# Patient Record
Sex: FEMALE | Race: WHITE | NOT HISPANIC OR LATINO | Employment: FULL TIME | URBAN - METROPOLITAN AREA
[De-identification: names, ages, dates, MRNs, and addresses within clinical notes are randomized per-mention and may not be internally consistent; named-entity substitution may affect disease eponyms.]

---

## 2020-06-18 ENCOUNTER — LAB VISIT (OUTPATIENT)
Dept: PRIMARY CARE CLINIC | Facility: OTHER | Age: 65
End: 2020-06-18

## 2020-06-18 DIAGNOSIS — J02.9 SORE THROAT: ICD-10-CM

## 2020-06-18 DIAGNOSIS — Z03.818 ENCOUNTER FOR OBSERVATION FOR SUSPECTED EXPOSURE TO OTHER BIOLOGICAL AGENTS RULED OUT: ICD-10-CM

## 2020-06-18 PROCEDURE — U0003 INFECTIOUS AGENT DETECTION BY NUCLEIC ACID (DNA OR RNA); SEVERE ACUTE RESPIRATORY SYNDROME CORONAVIRUS 2 (SARS-COV-2) (CORONAVIRUS DISEASE [COVID-19]), AMPLIFIED PROBE TECHNIQUE, MAKING USE OF HIGH THROUGHPUT TECHNOLOGIES AS DESCRIBED BY CMS-2020-01-R: HCPCS

## 2020-06-21 LAB — SARS-COV-2 RNA RESP QL NAA+PROBE: NOT DETECTED

## 2021-04-26 ENCOUNTER — PATIENT MESSAGE (OUTPATIENT)
Dept: RESEARCH | Facility: HOSPITAL | Age: 66
End: 2021-04-26

## 2023-06-26 ENCOUNTER — RESEARCH ENCOUNTER (OUTPATIENT)
Dept: RESEARCH | Facility: OTHER | Age: 68
End: 2023-06-26

## 2023-06-26 NOTE — RESEARCH
Sponsor: [employer]     Study Title/IRB Number: [id number]    Principle Investigator: Dr. Álvaro Pruitt    Patient eligibility was checked prior to enrollment in the study. Patient met the following inclusion and exclusion criteria:     INCLUSION CRITERIA  Participant age is 50 years or older at the time of signing the Informed Consent form  Participant is capable of giving signed Informed Consent, which includes compliance with the requirements and restrictions in the informed consent form and the protocol    EXCLUSION CRITERIA  Participant is undergoing or referred for diagnostic evaluation due to clinical suspicion for cancer  Participant has a personal history of invasive or hematologic malignancy, diagnosed within the last 3 years prior to expected enrollment date or diagnosed greater than 3 years prior to expected enrollment date and never treated  Participant has had definitive treatment for invasive or hematologic malignancy within the 3 years prior to expected enrollment date  Participant is not able to comply with protocol procedures  Participant is not a current patient at a participating center  Participant is currently enrolled or was previously enrolled in another [employer]-sponsored study  Participant is current or previous employee/contractor of [employer]  Participant is currently pregnant (by participant's self-report of pregnancy status)    Prior to the Informed Consent (IC) being signed, or any study protocol required data collection, testing, procedure, or intervention being performed, the following was done and/or discussed:  Patient was given a copy of the IC for review   Purpose of the study and qualifications to participate   Study design, Follow up schedule, and tests or procedures done at each visit  Confidentiality and HIPAA Authorization for Release of Medical Records for the research trial/ subject's rights/research related injury  Risk, Benefits, Alternative Treatments, Compensation and  Costs  Participation in the research trial is voluntary and patient may withdraw at anytime  Contact information for study related questions    Patient verbalizes understanding of the above: Yes  Contact information for CRC and PI given to patient: Yes  Patient able to adequately summarize: the purpose of the study, the risks associated with the study, and all procedures, testing, and follow-ups associated with the study: Yes    Patient signed the informed consent form for the research study with an IRB approval date of 4/25/2022. Each page of the consent form was reviewed with patient and all questions answered satisfactorily.   Patient received a copy of the consent form. The original consent was scanned into electronic medical records.    Anthropometric Data    Participant is a 67 y.o., White, Not  or /a, female  Participant height: 5'4  Participant weight: 117 lbs       Smoking History and Alcohol use     Please indicate whether the participant smoked at least 100 cigarettes in their lifetime:   Yes  Please indicate whether the participant is a current smoker:  No  Age participant started smoking cigarettes:  26 years old  Age participant stopped smoking cigarettes:  29 years old  During their time as a smoker, please indicate if the participant stopped smoking for a month or more: Not Applicable  Please indicate how many cigarettes per day did the participant smoke on average for the majority of their time as a smoker: 20 cigarettes per day    During the last 12 months, how often did the participant have any kind of drink containing alcohol? Count as one drink a 12 ounce can or glass of beer, a 5 ounce glass of wine, or a drink containing 1 shot of liquor. Participant has consumed alcohol previously, but not during the past 12 months}  During the last 12 months, how many drinks did the participant have on days when they drank alcohol?Not Applicable    Blood Draw    Following IC being signed and  prior to blood draw, patient completed all baseline/pretest questionnaires. The following specimens were collected from the pt at the time of this encounter via peripheral blood draw.    Blood draw location: Right Arm  Needle used: 21 gauge butterfly needle  Blood draw amount: 40ml  Blood draw time: 11:25AM 6/26/2023

## 2023-07-07 ENCOUNTER — RESEARCH ENCOUNTER (OUTPATIENT)
Dept: RESEARCH | Facility: OTHER | Age: 68
End: 2023-07-07

## 2023-07-07 NOTE — PROGRESS NOTES
Kendrick Pathfinder 2022.003    Kendrick ID: [id number]     Results the Kendrick Alejandre Multi Cancer Early Detection test and were reviewed by PI Dr Pruitt. Patient was called and notified of test results, there was no signal detected.    Patient reminded, this was a screening test, so we still highly encourage them to continue other normal health screenings  and to continue to adhere to guideline-recommended cancer screening.    Patient was asked about completing 30 day questionnaire online and was agreeable.

## 2024-04-10 ENCOUNTER — TELEPHONE (OUTPATIENT)
Dept: PRIMARY CARE CLINIC | Facility: CLINIC | Age: 69
End: 2024-04-10
Payer: MEDICARE

## 2024-04-10 NOTE — TELEPHONE ENCOUNTER
----- Message from Lala Shah sent at 4/10/2024  4:45 PM CDT -----  Contact: [phone]  Pt states Dr Naylor came highly recommended by a friend of hers (Kashmir Blanca). She lives out of town but would like to est care with her and no appt are showing up available. She is requesting First choice is week of 5/20/2024 and 2nd choice is week of 5/13/2024. Can you call her to discuss if this would be possible? Thanks

## 2024-04-11 ENCOUNTER — TELEPHONE (OUTPATIENT)
Dept: PRIMARY CARE CLINIC | Facility: CLINIC | Age: 69
End: 2024-04-11
Payer: MEDICARE

## 2024-04-11 NOTE — TELEPHONE ENCOUNTER
----- Message from Rachelle Silveira sent at 4/11/2024  1:31 PM CDT -----  Contact: [phone]  Patient is returning a phone call.  Who left a message for the patient: Dr Naylor's office  Does patient know what this is regarding:  yes  Would you like a call back, or a response through your [facility] portal?:   phone  Comments:

## 2024-04-11 NOTE — TELEPHONE ENCOUNTER
Here with mom Tasha.    Mom states that patient has been having clear to green yellow nasal discharge since last week Wednesday.     Denies fevers.    Decreased appetite.     Mom states that patient is not sleeping well.     Using nose katie and saline will work good for a few moments then back to square one. Also tried to use a vicks rub and it has not helped either.      I sw pt and scheduled an appt to est care

## 2024-05-05 ENCOUNTER — RX ONLY (RX ONLY)
Age: 69
End: 2024-05-05

## 2024-05-08 ENCOUNTER — TELEPHONE (OUTPATIENT)
Dept: PRIMARY CARE CLINIC | Facility: CLINIC | Age: 69
End: 2024-05-08
Payer: MEDICARE

## 2024-05-08 NOTE — TELEPHONE ENCOUNTER
----- Message from Chris Aguiar sent at 5/8/2024  2:16 PM CDT -----  Contact: [phone]  Patient states she would like to have medical records sent to office please provide an email.  Patient states she can not fax information. Please call and advise.    Thank you and have a great day.

## 2024-05-20 ENCOUNTER — OFFICE VISIT (OUTPATIENT)
Dept: PRIMARY CARE CLINIC | Facility: CLINIC | Age: 69
End: 2024-05-20
Payer: MEDICARE

## 2024-05-20 ENCOUNTER — LAB VISIT (OUTPATIENT)
Dept: LAB | Facility: HOSPITAL | Age: 69
End: 2024-05-20
Payer: MEDICARE

## 2024-05-20 VITALS
DIASTOLIC BLOOD PRESSURE: 78 MMHG | OXYGEN SATURATION: 98 % | BODY MASS INDEX: 22.39 KG/M2 | WEIGHT: 121.69 LBS | HEART RATE: 66 BPM | HEIGHT: 62 IN | SYSTOLIC BLOOD PRESSURE: 110 MMHG

## 2024-05-20 DIAGNOSIS — M40.209 KYPHOSIS, UNSPECIFIED KYPHOSIS TYPE, UNSPECIFIED SPINAL REGION: ICD-10-CM

## 2024-05-20 DIAGNOSIS — R15.9 INCONTINENCE OF FECES, UNSPECIFIED FECAL INCONTINENCE TYPE: ICD-10-CM

## 2024-05-20 DIAGNOSIS — Z00.00 ANNUAL PHYSICAL EXAM: Primary | ICD-10-CM

## 2024-05-20 DIAGNOSIS — Z00.00 ANNUAL PHYSICAL EXAM: ICD-10-CM

## 2024-05-20 DIAGNOSIS — F98.8 ATTENTION DEFICIT DISORDER, UNSPECIFIED HYPERACTIVITY PRESENCE: ICD-10-CM

## 2024-05-20 DIAGNOSIS — Z12.11 ENCOUNTER FOR COLORECTAL CANCER SCREENING: ICD-10-CM

## 2024-05-20 DIAGNOSIS — Z78.0 MENOPAUSE: ICD-10-CM

## 2024-05-20 DIAGNOSIS — Z12.31 ENCOUNTER FOR SCREENING MAMMOGRAM FOR MALIGNANT NEOPLASM OF BREAST: ICD-10-CM

## 2024-05-20 DIAGNOSIS — Z76.89 ESTABLISHING CARE WITH NEW DOCTOR, ENCOUNTER FOR: ICD-10-CM

## 2024-05-20 DIAGNOSIS — N81.89 PELVIC FLOOR WEAKNESS IN FEMALE: ICD-10-CM

## 2024-05-20 DIAGNOSIS — R87.619 ABNORMAL CERVICAL PAPANICOLAOU SMEAR, UNSPECIFIED ABNORMAL PAP FINDING: ICD-10-CM

## 2024-05-20 DIAGNOSIS — L98.8 WRINKLES: ICD-10-CM

## 2024-05-20 DIAGNOSIS — E04.1 THYROID NODULE: ICD-10-CM

## 2024-05-20 DIAGNOSIS — Z12.12 ENCOUNTER FOR COLORECTAL CANCER SCREENING: ICD-10-CM

## 2024-05-20 DIAGNOSIS — Z79.899 OTHER LONG TERM (CURRENT) DRUG THERAPY: ICD-10-CM

## 2024-05-20 DIAGNOSIS — D22.9 ATYPICAL MOLE: ICD-10-CM

## 2024-05-20 DIAGNOSIS — Z78.9 VEGETARIAN DIET: ICD-10-CM

## 2024-05-20 DIAGNOSIS — Z91.89 DES EXPOSURE IN UTERO: ICD-10-CM

## 2024-05-20 LAB
25(OH)D3+25(OH)D2 SERPL-MCNC: 62 NG/ML (ref 30–96)
ALBUMIN SERPL BCP-MCNC: 4.2 G/DL (ref 3.5–5.2)
ALP SERPL-CCNC: 44 U/L (ref 55–135)
ALT SERPL W/O P-5'-P-CCNC: 27 U/L (ref 10–44)
ANION GAP SERPL CALC-SCNC: 9 MMOL/L (ref 8–16)
AST SERPL-CCNC: 31 U/L (ref 10–40)
BASOPHILS # BLD AUTO: 0.05 K/UL (ref 0–0.2)
BASOPHILS NFR BLD: 1 % (ref 0–1.9)
BILIRUB SERPL-MCNC: 0.6 MG/DL (ref 0.1–1)
BILIRUB UR QL STRIP: NEGATIVE
BUN SERPL-MCNC: 20 MG/DL (ref 8–23)
CALCIUM SERPL-MCNC: 9.9 MG/DL (ref 8.7–10.5)
CHLORIDE SERPL-SCNC: 100 MMOL/L (ref 95–110)
CHOLEST SERPL-MCNC: 268 MG/DL (ref 120–199)
CHOLEST/HDLC SERPL: 2.6 {RATIO} (ref 2–5)
CLARITY UR REFRACT.AUTO: CLEAR
CO2 SERPL-SCNC: 28 MMOL/L (ref 23–29)
COLOR UR AUTO: YELLOW
CREAT SERPL-MCNC: 1 MG/DL (ref 0.5–1.4)
DIFFERENTIAL METHOD BLD: ABNORMAL
EOSINOPHIL # BLD AUTO: 0.4 K/UL (ref 0–0.5)
EOSINOPHIL NFR BLD: 6.8 % (ref 0–8)
ERYTHROCYTE [DISTWIDTH] IN BLOOD BY AUTOMATED COUNT: 13.3 % (ref 11.5–14.5)
EST. GFR  (NO RACE VARIABLE): >60 ML/MIN/1.73 M^2
ESTIMATED AVG GLUCOSE: 111 MG/DL (ref 68–131)
GLUCOSE SERPL-MCNC: 88 MG/DL (ref 70–110)
GLUCOSE UR QL STRIP: NEGATIVE
HBA1C MFR BLD: 5.5 % (ref 4–5.6)
HCT VFR BLD AUTO: 44.5 % (ref 37–48.5)
HCV AB SERPL QL IA: NORMAL
HDLC SERPL-MCNC: 102 MG/DL (ref 40–75)
HDLC SERPL: 38.1 % (ref 20–50)
HGB BLD-MCNC: 14.8 G/DL (ref 12–16)
HGB UR QL STRIP: NEGATIVE
IMM GRANULOCYTES # BLD AUTO: 0.01 K/UL (ref 0–0.04)
IMM GRANULOCYTES NFR BLD AUTO: 0.2 % (ref 0–0.5)
KETONES UR QL STRIP: NEGATIVE
LDLC SERPL CALC-MCNC: 153.6 MG/DL (ref 63–159)
LEUKOCYTE ESTERASE UR QL STRIP: NEGATIVE
LYMPHOCYTES # BLD AUTO: 2.5 K/UL (ref 1–4.8)
LYMPHOCYTES NFR BLD: 48.8 % (ref 18–48)
MCH RBC QN AUTO: 30.7 PG (ref 27–31)
MCHC RBC AUTO-ENTMCNC: 33.3 G/DL (ref 32–36)
MCV RBC AUTO: 92 FL (ref 82–98)
MONOCYTES # BLD AUTO: 0.5 K/UL (ref 0.3–1)
MONOCYTES NFR BLD: 8.9 % (ref 4–15)
NEUTROPHILS # BLD AUTO: 1.8 K/UL (ref 1.8–7.7)
NEUTROPHILS NFR BLD: 34.3 % (ref 38–73)
NITRITE UR QL STRIP: NEGATIVE
NONHDLC SERPL-MCNC: 166 MG/DL
NRBC BLD-RTO: 0 /100 WBC
PH UR STRIP: 5 [PH] (ref 5–8)
PLATELET # BLD AUTO: 302 K/UL (ref 150–450)
PMV BLD AUTO: 11.3 FL (ref 9.2–12.9)
POTASSIUM SERPL-SCNC: 4.5 MMOL/L (ref 3.5–5.1)
PROT SERPL-MCNC: 7.5 G/DL (ref 6–8.4)
PROT UR QL STRIP: NEGATIVE
RBC # BLD AUTO: 4.82 M/UL (ref 4–5.4)
SODIUM SERPL-SCNC: 137 MMOL/L (ref 136–145)
SP GR UR STRIP: 1.01 (ref 1–1.03)
TRIGL SERPL-MCNC: 62 MG/DL (ref 30–150)
TSH SERPL DL<=0.005 MIU/L-ACNC: 1.57 UIU/ML (ref 0.4–4)
URN SPEC COLLECT METH UR: NORMAL
VIT B12 SERPL-MCNC: 1248 PG/ML (ref 210–950)
WBC # BLD AUTO: 5.14 K/UL (ref 3.9–12.7)

## 2024-05-20 PROCEDURE — 80061 LIPID PANEL: CPT | Performed by: STUDENT IN AN ORGANIZED HEALTH CARE EDUCATION/TRAINING PROGRAM

## 2024-05-20 PROCEDURE — 99204 OFFICE O/P NEW MOD 45 MIN: CPT | Mod: S$GLB,,, | Performed by: STUDENT IN AN ORGANIZED HEALTH CARE EDUCATION/TRAINING PROGRAM

## 2024-05-20 PROCEDURE — 85025 COMPLETE CBC W/AUTO DIFF WBC: CPT | Performed by: STUDENT IN AN ORGANIZED HEALTH CARE EDUCATION/TRAINING PROGRAM

## 2024-05-20 PROCEDURE — 80053 COMPREHEN METABOLIC PANEL: CPT | Performed by: STUDENT IN AN ORGANIZED HEALTH CARE EDUCATION/TRAINING PROGRAM

## 2024-05-20 PROCEDURE — 3078F DIAST BP <80 MM HG: CPT | Mod: CPTII,S$GLB,, | Performed by: STUDENT IN AN ORGANIZED HEALTH CARE EDUCATION/TRAINING PROGRAM

## 2024-05-20 PROCEDURE — 82306 VITAMIN D 25 HYDROXY: CPT | Performed by: STUDENT IN AN ORGANIZED HEALTH CARE EDUCATION/TRAINING PROGRAM

## 2024-05-20 PROCEDURE — 3288F FALL RISK ASSESSMENT DOCD: CPT | Mod: CPTII,S$GLB,, | Performed by: STUDENT IN AN ORGANIZED HEALTH CARE EDUCATION/TRAINING PROGRAM

## 2024-05-20 PROCEDURE — 3008F BODY MASS INDEX DOCD: CPT | Mod: CPTII,S$GLB,, | Performed by: STUDENT IN AN ORGANIZED HEALTH CARE EDUCATION/TRAINING PROGRAM

## 2024-05-20 PROCEDURE — 81003 URINALYSIS AUTO W/O SCOPE: CPT | Performed by: STUDENT IN AN ORGANIZED HEALTH CARE EDUCATION/TRAINING PROGRAM

## 2024-05-20 PROCEDURE — 1160F RVW MEDS BY RX/DR IN RCRD: CPT | Mod: CPTII,S$GLB,, | Performed by: STUDENT IN AN ORGANIZED HEALTH CARE EDUCATION/TRAINING PROGRAM

## 2024-05-20 PROCEDURE — 1126F AMNT PAIN NOTED NONE PRSNT: CPT | Mod: CPTII,S$GLB,, | Performed by: STUDENT IN AN ORGANIZED HEALTH CARE EDUCATION/TRAINING PROGRAM

## 2024-05-20 PROCEDURE — 99999 PR PBB SHADOW E&M-EST. PATIENT-LVL V: CPT | Mod: PBBFAC,,, | Performed by: STUDENT IN AN ORGANIZED HEALTH CARE EDUCATION/TRAINING PROGRAM

## 2024-05-20 PROCEDURE — 82607 VITAMIN B-12: CPT | Performed by: STUDENT IN AN ORGANIZED HEALTH CARE EDUCATION/TRAINING PROGRAM

## 2024-05-20 PROCEDURE — 86803 HEPATITIS C AB TEST: CPT | Performed by: STUDENT IN AN ORGANIZED HEALTH CARE EDUCATION/TRAINING PROGRAM

## 2024-05-20 PROCEDURE — 36415 COLL VENOUS BLD VENIPUNCTURE: CPT | Mod: PN | Performed by: STUDENT IN AN ORGANIZED HEALTH CARE EDUCATION/TRAINING PROGRAM

## 2024-05-20 PROCEDURE — 83036 HEMOGLOBIN GLYCOSYLATED A1C: CPT | Performed by: STUDENT IN AN ORGANIZED HEALTH CARE EDUCATION/TRAINING PROGRAM

## 2024-05-20 PROCEDURE — 1101F PT FALLS ASSESS-DOCD LE1/YR: CPT | Mod: CPTII,S$GLB,, | Performed by: STUDENT IN AN ORGANIZED HEALTH CARE EDUCATION/TRAINING PROGRAM

## 2024-05-20 PROCEDURE — 1159F MED LIST DOCD IN RCRD: CPT | Mod: CPTII,S$GLB,, | Performed by: STUDENT IN AN ORGANIZED HEALTH CARE EDUCATION/TRAINING PROGRAM

## 2024-05-20 PROCEDURE — 3074F SYST BP LT 130 MM HG: CPT | Mod: CPTII,S$GLB,, | Performed by: STUDENT IN AN ORGANIZED HEALTH CARE EDUCATION/TRAINING PROGRAM

## 2024-05-20 PROCEDURE — 84443 ASSAY THYROID STIM HORMONE: CPT | Performed by: STUDENT IN AN ORGANIZED HEALTH CARE EDUCATION/TRAINING PROGRAM

## 2024-05-20 RX ORDER — TRETINOIN 1 MG/G
CREAM TOPICAL NIGHTLY
Qty: 20 G | Refills: 0 | Status: SHIPPED | OUTPATIENT
Start: 2024-05-20 | End: 2024-05-22

## 2024-05-20 RX ORDER — DEXTROAMPHETAMINE SACCHARATE, AMPHETAMINE ASPARTATE, DEXTROAMPHETAMINE SULFATE, AND AMPHETAMINE SULFATE 7.5; 7.5; 7.5; 7.5 MG/1; MG/1; MG/1; MG/1
30 TABLET ORAL DAILY
COMMUNITY
Start: 2022-01-01

## 2024-05-20 NOTE — PROGRESS NOTES
Marisa Lin  1955        Subjective     Chief Complaint: HTN follow-up    History of Present Illness:  Ms. Marisa Lin is a 68 y.o. female who presents to clinic for est care. Lives in [place]. Travels back and forth to [place]. Needs new PCP.     Runs marathons. Also pilates. Underwater running.   Noticed fecal and urinary incontinence. Mainly with long-distance running. Sometimes has to take imodium before.   Also some chronic constipation. Not new. Used to be on fiber but not recently.   Has had vaginal deliveries x2.    Abnormal pap smear in 1988. Mom took ETRRA (diethylstilbestrol) while pregnant with her.   HPV+  Reports recently pap smears have been normal per pt. Due now. Needs new OBGYN.    Hx of thyroid nodule- has had bx years ago. No longer on Synthroid.    ADD- on Adderall. Sees Psych.     Was on Retinol 1%, asking for refill.     Due for mammogram, colonoscopy, pap smear/GYN visit, DEXA.  Q 5 yrs colon per pt, polyp.      Review of Systems   Constitutional:  Negative for chills, fever and malaise/fatigue.   HENT:  Negative for congestion and sore throat.    Respiratory:  Negative for cough.    Cardiovascular:  Negative for leg swelling.   Gastrointestinal:  Positive for constipation (Chronic).   Genitourinary:  Positive for frequency.   Musculoskeletal:  Positive for back pain.   Neurological:  Negative for sensory change and speech change.   Psychiatric/Behavioral:  The patient is not nervous/anxious.         PAST HISTORY:     Past Medical History:   Diagnosis Date    Abnormal Pap smear of cervix     ADD (attention deficit disorder)     Nontoxic single thyroid nodule        Past Surgical History:   Procedure Laterality Date    BIOPSY OF THYROID      FACIAL COSMETIC SURGERY      2017       No family history on file.      MEDICATIONS & ALLERGIES:     Current Outpatient Medications on File Prior to Visit   Medication Sig    ADDERALL 30 mg Tab Take 30 mg by mouth once daily. 1/2 in the am, 1/2  "in the pm daily.     No current facility-administered medications on file prior to visit.       Review of patient's allergies indicates:  No Known Allergies    OBJECTIVE:     Vital Signs:  Vitals:    05/20/24 1105   BP: 110/78   BP Location: Right arm   Patient Position: Sitting   BP Method: Medium (Manual)   Pulse: 66   SpO2: 98%   Weight: 55.2 kg (121 lb 11.1 oz)   Height: 5' 2" (1.575 m)       Body mass index is 22.26 kg/m².     Physical Exam:  Physical Exam  Vitals and nursing note reviewed.   Constitutional:       General: She is not in acute distress.     Appearance: Normal appearance. She is not ill-appearing, toxic-appearing or diaphoretic.   HENT:      Head: Normocephalic and atraumatic.        Comments: Brown skin lesion of right upper ear  Eyes:      General: No scleral icterus.        Right eye: No discharge.         Left eye: No discharge.      Conjunctiva/sclera: Conjunctivae normal.   Cardiovascular:      Rate and Rhythm: Normal rate and regular rhythm.      Heart sounds: Normal heart sounds.   Pulmonary:      Effort: Pulmonary effort is normal. No respiratory distress.      Breath sounds: Normal breath sounds. No wheezing or rales.   Musculoskeletal:         General: No swelling. Normal range of motion.      Cervical back: Normal range of motion and neck supple. No rigidity or tenderness.      Right lower leg: No edema.      Left lower leg: No edema.   Lymphadenopathy:      Cervical: No cervical adenopathy.   Skin:     General: Skin is warm and dry.             Comments: Small, brown, flat, stuck-on appearing lesion on right mid back   Neurological:      Mental Status: She is alert and oriented to person, place, and time. Mental status is at baseline.      Gait: Gait normal.   Psychiatric:         Mood and Affect: Mood normal.         Behavior: Behavior normal.            Laboratory  No results found for: "GLU", "NA", "K", "CL", "CO2", "BUN", "CREATININE", "CALCIUM", "MG"  No results found for: " ""HGBA1C"  No results for input(s): "POCTGLUCOSE" in the last 72 hours.        ASSESSMENT & PLAN:   Ms. Marisa Lin is a 68 y.o. female who was seen today in clinic for est care. Increase fiber. Start with pelvic floor PT and Urogyn eval.   Colorectal if needed.   Due for colonoscopy, mammogram, DEXA, Pap smear, thyroid US.    Diagnoses and all orders for this visit:    Annual physical exam  -     CBC Auto Differential; Future  -     Comprehensive Metabolic Panel; Future  -     Hemoglobin A1C; Future  -     Lipid Panel; Future  -     TSH; Future  -     Vitamin D; Future  -     Hepatitis C Antibody; Future    Establishing care with new doctor, encounter for  -     CBC Auto Differential; Future  -     Comprehensive Metabolic Panel; Future  -     Hemoglobin A1C; Future  -     Lipid Panel; Future  -     TSH; Future  -     Vitamin D; Future  -     Hepatitis C Antibody; Future    Pelvic floor weakness in female  -     CBC Auto Differential; Future  -     Comprehensive Metabolic Panel; Future  -     Hemoglobin A1C; Future  -     Lipid Panel; Future  -     TSH; Future  -     Vitamin D; Future  -     Hepatitis C Antibody; Future  -     Ambulatory referral/consult to Physical/Occupational Therapy; Future  -     Cancel: Ambulatory referral/consult to Colorectal Surgery; Future  -     Urinalysis, Reflex to Urine Culture Urine, Clean Catch  -     Ambulatory referral/consult to Urogynecology; Future  -     VITAMIN B12; Future    Incontinence of feces, unspecified fecal incontinence type  -     CBC Auto Differential; Future  -     Comprehensive Metabolic Panel; Future  -     Hemoglobin A1C; Future  -     Lipid Panel; Future  -     TSH; Future  -     Vitamin D; Future  -     Hepatitis C Antibody; Future  -     Ambulatory referral/consult to Physical/Occupational Therapy; Future  -     Cancel: Ambulatory referral/consult to Colorectal Surgery; Future  -     VITAMIN B12; Future    Attention deficit disorder, unspecified " hyperactivity presence  -     CBC Auto Differential; Future  -     Comprehensive Metabolic Panel; Future  -     Hemoglobin A1C; Future  -     Lipid Panel; Future  -     TSH; Future  -     Vitamin D; Future  -     Hepatitis C Antibody; Future  -     VITAMIN B12; Future    Other long term (current) drug therapy  -     CBC Auto Differential; Future  -     Comprehensive Metabolic Panel; Future  -     Hemoglobin A1C; Future  -     Lipid Panel; Future  -     TSH; Future  -     Vitamin D; Future  -     Hepatitis C Antibody; Future  -     VITAMIN B12; Future    Menopause  -     CBC Auto Differential; Future  -     Comprehensive Metabolic Panel; Future  -     Hemoglobin A1C; Future  -     Lipid Panel; Future  -     TSH; Future  -     Vitamin D; Future  -     Hepatitis C Antibody; Future  -     DXA Bone Density Axial Skeleton 1 or more sites; Future  -     Ambulatory referral/consult to Obstetrics / Gynecology  -     VITAMIN B12; Future    Atypical mole  -     Ambulatory referral/consult to Dermatology; Future    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    Vegetarian diet  -     VITAMIN B12; Future    Encounter for colorectal cancer screening  -     Ambulatory referral/consult to Endo Procedure ; Future    Encounter for screening mammogram for malignant neoplasm of breast  -     Mammo Digital Screening Bilat w/ Hasmukh; Future    Kyphosis, unspecified kyphosis type, unspecified spinal region  -     CBC Auto Differential; Future  -     Comprehensive Metabolic Panel; Future  -     Hemoglobin A1C; Future  -     Lipid Panel; Future  -     TSH; Future  -     Vitamin D; Future  -     Hepatitis C Antibody;  Future  -     VITAMIN B12; Future         1. Annual physical exam    2. Establishing care with new doctor, encounter for    3. Pelvic floor weakness in female    4. Incontinence of feces, unspecified fecal incontinence type    5. Attention deficit disorder, unspecified hyperactivity presence    6. Other long term (current) drug therapy    7. Menopause    8. Atypical mole    9. 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 Abnormal cervical Papanicolaou smear, unspecified abnormal pap finding    11. Thyroid nodule    12. Wrinkles    13. Vegetarian diet    14. Encounter for colorectal cancer screening    15. Encounter for screening mammogram for malignant neoplasm of breast    16. Kyphosis, unspecified kyphosis type, unspecified spinal region        RTC in     Alexsandra Naylor MD

## 2024-05-21 ENCOUNTER — TELEPHONE (OUTPATIENT)
Dept: PRIMARY CARE CLINIC | Facility: CLINIC | Age: 69
End: 2024-05-21
Payer: MEDICARE

## 2024-05-21 NOTE — TELEPHONE ENCOUNTER
----- Message from Valentina Ruiz sent at 5/21/2024  9:04 AM CDT -----  Contact: Pt  [phone]  Patient would like the 3 orders from yesterday sent to [facility] so that she can get them completed ASAP    Please call and advise.    Thank You

## 2024-05-22 ENCOUNTER — PATIENT MESSAGE (OUTPATIENT)
Dept: PRIMARY CARE CLINIC | Facility: CLINIC | Age: 69
End: 2024-05-22
Payer: MEDICARE

## 2024-05-22 DIAGNOSIS — M81.0 AGE-RELATED OSTEOPOROSIS WITHOUT CURRENT PATHOLOGICAL FRACTURE: ICD-10-CM

## 2024-05-22 DIAGNOSIS — L98.8 WRINKLES: ICD-10-CM

## 2024-05-22 DIAGNOSIS — E04.1 THYROID NODULE: Primary | ICD-10-CM

## 2024-05-22 DIAGNOSIS — N81.89 PELVIC FLOOR WEAKNESS IN FEMALE: Primary | ICD-10-CM

## 2024-05-22 DIAGNOSIS — R15.9 INCONTINENCE OF FECES, UNSPECIFIED FECAL INCONTINENCE TYPE: ICD-10-CM

## 2024-05-22 LAB — BCS RECOMMENDATION EXT: NORMAL

## 2024-05-22 RX ORDER — TRETINOIN 1 MG/G
CREAM TOPICAL NIGHTLY
Qty: 45 G | Refills: 0 | Status: SHIPPED | OUTPATIENT
Start: 2024-05-22

## 2024-05-22 NOTE — TELEPHONE ENCOUNTER
Called pt, she felt she needed to speak with a provider urgently and is going out of town on Friday, scheduled for virtual at 4:15 w/ NP Rachelle to discuss concern about family Hx of heart issues

## 2024-05-22 NOTE — TELEPHONE ENCOUNTER
----- Message from Betsy Rock sent at 5/22/2024  1:55 PM CDT -----  Contact: Pt [phone]  She said to send another script for the name brand tretinoin (RETIN-A) 0.1 % cream and increase 45g and she will pay out of pocket for it because the  is running a [employer]    [id number] - [facility] [address] [place] [address] AT [facility] Phone: [phone] Fax: [phone]

## 2024-05-22 NOTE — PROGRESS NOTES
US head and neck with thyroid nodule recommending BX. Endo referral placed. Images of reports below.  Can we let her know? She can also do this in [place] if she desires.  Additionally, her DEXA shows osteoporosis. If she would like to discuss treatments please let me know.

## 2024-05-23 ENCOUNTER — OFFICE VISIT (OUTPATIENT)
Dept: PRIMARY CARE CLINIC | Facility: CLINIC | Age: 69
End: 2024-05-23
Payer: MEDICARE

## 2024-05-23 DIAGNOSIS — Z79.899 ENCOUNTER FOR MEDICATION REVIEW: Primary | ICD-10-CM

## 2024-05-23 PROCEDURE — 1159F MED LIST DOCD IN RCRD: CPT | Mod: CPTII,95,, | Performed by: NURSE PRACTITIONER

## 2024-05-23 PROCEDURE — 99213 OFFICE O/P EST LOW 20 MIN: CPT | Mod: 95,,, | Performed by: NURSE PRACTITIONER

## 2024-05-23 PROCEDURE — 3044F HG A1C LEVEL LT 7.0%: CPT | Mod: CPTII,95,, | Performed by: NURSE PRACTITIONER

## 2024-05-23 PROCEDURE — 1160F RVW MEDS BY RX/DR IN RCRD: CPT | Mod: CPTII,95,, | Performed by: NURSE PRACTITIONER

## 2024-05-23 NOTE — PROGRESS NOTES
[facility] Note    Chief Complaint      Chief Complaint   Patient presents with    lab results       History of Present Illness      Marisa Lin is a 68 y.o. female who presents today via virtual visit for   Chief Complaint   Patient presents with    lab results         Patient is new to me.  She presents via virtual visit to discuss lab results from 05/20/2024.  Lab results were reviewed with patient and questions answered.  Patient is also requesting a coronary calcium scan.  I will pass this information on to her primary care physician.  Patient reports eating healthy daily and exercising by running marathons.  There are no other issues to address at this time.         Review of Systems   All 12 systems otherwise negative.       Family History:  family history is not on file.   Family history was reviewed with patient.     Medications:  Outpatient Encounter Medications as of 5/23/2024   Medication Sig Dispense Refill    ADDERALL 30 mg Tab Take 30 mg by mouth once daily. 1/2 in the am, 1/2 in the pm daily.      RETIN-A 0.1 % cream Apply topically every evening. 45 g 0    [DISCONTINUED] tretinoin (RETIN-A) 0.1 % cream Apply topically every evening. 20 g 0     No facility-administered encounter medications on file as of 5/23/2024.       Allergies:  Review of patient's allergies indicates:  No Known Allergies    Health Maintenance:  Health Maintenance   Topic Date Due    TETANUS VACCINE  Never done    Mammogram  Never done    DEXA Scan  Never done    Colorectal Cancer Screening  Never done    Shingles Vaccine (1 of 2) Never done    Lipid Panel  05/20/2029    Hepatitis C Screening  Completed     Health Maintenance Topics with due status: Not Due       Topic Last Completion Date    Lipid Panel 05/20/2024    Influenza Vaccine Not Due       Physical Exam       ]        Assessment/Plan     Marisa Lin is a 68 y.o.female with:    There are no diagnoses linked to this encounter.    As above, continue  current medications and maintain follow up with specialists.  Return to clinic as needed.    Greater than 50% of this time was spent face to face via virtual visit with patient.  All questions were answered to patient's satisfaction.    The patient location is: home  The chief complaint leading to consultation is: questions about labs    Visit type: audiovisual    Face to Face time with patient:   20 minutes of total time spent on the encounter, which includes face to face time and non-face to face time preparing to see the patient (eg, review of tests), Obtaining and/or reviewing separately obtained history, Documenting clinical information in the electronic or other health record, Independently interpreting results (not separately reported) and communicating results to the patient/family/caregiver, or Care coordination (not separately reported).         Each patient to whom he or she provides medical services by telemedicine is:  (1) informed of the relationship between the physician and patient and the respective role of any other health care provider with respect to management of the patient; and (2) notified that he or she may decline to receive medical services by telemedicine and may withdraw from such care at any time.       Karen L Spencer, NP-C [facility]    Answers submitted by the patient for this visit:  Review of Systems Questionnaire (Submitted on 5/23/2024)  activity change: No  unexpected weight change: No  neck pain: No  hearing loss: No  rhinorrhea: No  trouble swallowing: No  eye discharge: No  visual disturbance: No  chest tightness: No  wheezing: No  chest pain: No  palpitations: No  blood in stool: No  constipation: No  vomiting: No  diarrhea: No  polydipsia: No  polyuria: No  difficulty urinating: No  hematuria: No  menstrual problem: No  dysuria: No  joint swelling: No  arthralgias: No  headaches: No  weakness: No  confusion: No  dysphoric mood: No

## 2024-06-20 ENCOUNTER — PATIENT MESSAGE (OUTPATIENT)
Dept: PRIMARY CARE CLINIC | Facility: CLINIC | Age: 69
End: 2024-06-20
Payer: MEDICARE

## 2024-06-20 DIAGNOSIS — E04.1 THYROID NODULE: Primary | ICD-10-CM

## 2024-07-08 ENCOUNTER — APPOINTMENT (RX ONLY)
Dept: URBAN - METROPOLITAN AREA CLINIC 15 | Facility: CLINIC | Age: 69
Setting detail: DERMATOLOGY
End: 2024-07-08

## 2024-07-08 VITALS — WEIGHT: 120 LBS | HEIGHT: 62 IN

## 2024-07-08 DIAGNOSIS — D18.0 HEMANGIOMA: ICD-10-CM

## 2024-07-08 DIAGNOSIS — L82.1 OTHER SEBORRHEIC KERATOSIS: ICD-10-CM

## 2024-07-08 DIAGNOSIS — L81.4 OTHER MELANIN HYPERPIGMENTATION: ICD-10-CM

## 2024-07-08 DIAGNOSIS — L64.8 OTHER ANDROGENIC ALOPECIA: ICD-10-CM | Status: INADEQUATELY CONTROLLED

## 2024-07-08 DIAGNOSIS — D22 MELANOCYTIC NEVI: ICD-10-CM

## 2024-07-08 PROBLEM — D18.01 HEMANGIOMA OF SKIN AND SUBCUTANEOUS TISSUE: Status: ACTIVE | Noted: 2024-07-08

## 2024-07-08 PROBLEM — D22.72 MELANOCYTIC NEVI OF LEFT LOWER LIMB, INCLUDING HIP: Status: ACTIVE | Noted: 2024-07-08

## 2024-07-08 PROBLEM — D22.5 MELANOCYTIC NEVI OF TRUNK: Status: ACTIVE | Noted: 2024-07-08

## 2024-07-08 PROBLEM — D22.61 MELANOCYTIC NEVI OF RIGHT UPPER LIMB, INCLUDING SHOULDER: Status: ACTIVE | Noted: 2024-07-08

## 2024-07-08 PROBLEM — D22.71 MELANOCYTIC NEVI OF RIGHT LOWER LIMB, INCLUDING HIP: Status: ACTIVE | Noted: 2024-07-08

## 2024-07-08 PROBLEM — D22.62 MELANOCYTIC NEVI OF LEFT UPPER LIMB, INCLUDING SHOULDER: Status: ACTIVE | Noted: 2024-07-08

## 2024-07-08 PROCEDURE — ? ADDITIONAL NOTES

## 2024-07-08 PROCEDURE — ? SUNSCREEN RECOMMENDATIONS

## 2024-07-08 PROCEDURE — ? COUNSELING

## 2024-07-08 PROCEDURE — ? PRESCRIPTION MEDICATION MANAGEMENT

## 2024-07-08 PROCEDURE — ? PRESCRIPTION

## 2024-07-08 PROCEDURE — 99204 OFFICE O/P NEW MOD 45 MIN: CPT

## 2024-07-08 RX ORDER — MINOXIDIL 2.5 MG/1
1 TABLET ORAL QD
Qty: 30 | Refills: 2 | Status: ERX | COMMUNITY
Start: 2024-07-08

## 2024-07-08 RX ADMIN — MINOXIDIL 1: 2.5 TABLET ORAL at 00:00

## 2024-07-08 ASSESSMENT — LOCATION SIMPLE DESCRIPTION DERM
LOCATION SIMPLE: LEFT FOREARM
LOCATION SIMPLE: CHEST
LOCATION SIMPLE: LEFT POSTERIOR UPPER ARM
LOCATION SIMPLE: POSTERIOR SCALP
LOCATION SIMPLE: RIGHT FOREARM
LOCATION SIMPLE: TRAPEZIAL NECK
LOCATION SIMPLE: LEFT PRETIBIAL REGION
LOCATION SIMPLE: RIGHT THIGH
LOCATION SIMPLE: UPPER BACK
LOCATION SIMPLE: LEFT UPPER BACK
LOCATION SIMPLE: ABDOMEN
LOCATION SIMPLE: RIGHT PRETIBIAL REGION
LOCATION SIMPLE: LEFT POSTERIOR THIGH
LOCATION SIMPLE: LEFT ANTERIOR NECK
LOCATION SIMPLE: RIGHT POSTERIOR UPPER ARM
LOCATION SIMPLE: LEFT THIGH

## 2024-07-08 ASSESSMENT — LOCATION ZONE DERM
LOCATION ZONE: NECK
LOCATION ZONE: SCALP
LOCATION ZONE: ARM
LOCATION ZONE: TRUNK
LOCATION ZONE: LEG

## 2024-07-08 ASSESSMENT — LOCATION DETAILED DESCRIPTION DERM
LOCATION DETAILED: EPIGASTRIC SKIN
LOCATION DETAILED: RIGHT ANTERIOR DISTAL THIGH
LOCATION DETAILED: LEFT PROXIMAL PRETIBIAL REGION
LOCATION DETAILED: RIGHT PROXIMAL DORSAL FOREARM
LOCATION DETAILED: UPPER STERNUM
LOCATION DETAILED: POSTERIOR MID-PARIETAL SCALP
LOCATION DETAILED: SUPERIOR THORACIC SPINE
LOCATION DETAILED: LEFT ANTERIOR DISTAL THIGH
LOCATION DETAILED: RIGHT DISTAL POSTERIOR UPPER ARM
LOCATION DETAILED: RIGHT PROXIMAL PRETIBIAL REGION
LOCATION DETAILED: LEFT DISTAL POSTERIOR UPPER ARM
LOCATION DETAILED: INFERIOR THORACIC SPINE
LOCATION DETAILED: LEFT PROXIMAL RADIAL DORSAL FOREARM
LOCATION DETAILED: LEFT MEDIAL UPPER BACK
LOCATION DETAILED: LEFT INFERIOR LATERAL NECK
LOCATION DETAILED: PERIUMBILICAL SKIN
LOCATION DETAILED: RIGHT PROXIMAL RADIAL DORSAL FOREARM
LOCATION DETAILED: LEFT DISTAL RADIAL DORSAL FOREARM
LOCATION DETAILED: LEFT DISTAL POSTERIOR THIGH
LOCATION DETAILED: MID TRAPEZIAL NECK

## 2024-07-08 NOTE — HPI: HAIR LOSS
Additional History: Pt uses men’s minoxidil (leaves hair oily) , biotin, “red light hat”, and reports hair coming out from the root for over 5 years. Pt is interested in oral minoxidil and recommendations. Consent for bx signed

## 2024-07-08 NOTE — PROCEDURE: PRESCRIPTION MEDICATION MANAGEMENT
Discontinue Regimen: .\\n\\n-Discontinue topical minoxidil\\n\\n.
Detail Level: Zone
Render In Strict Bullet Format?: No
Initiate Treatment: .\\n\\nORAL\\n-minoxidil 2.5 mg tablet: Take one quarter of a tablet by mouth once daily.\\n\\n.

## 2024-07-08 NOTE — PROCEDURE: COUNSELING
Detail Level: Detailed
Detail Level: Generalized
Minoxidil 5% Topical Foam Recommendations: Can purchase OTC at [employer], [employer], or [employer].

## 2024-07-08 NOTE — HPI: EVALUATION OF SKIN LESION(S)
What Type Of Note Output Would You Prefer (Optional)?: Bullet Format
Hpi Title: Evaluation of Skin Lesions
Additional History: Fbse, f/h of melanoma (niece and first cousin). Pt has never had skin CA. Concerned with a spot on her right ear and another on her back. Pt wants to ensure a thorough examination of the scalp is done due to a friend having melanoma removed from the scalp.
Family Member: Niece, cousin

## 2024-07-08 NOTE — PROCEDURE: ADDITIONAL NOTES
Additional Notes: Pt has been using Rogaine at home and a red-light hat. She would like additional tx options.
Render Risk Assessment In Note?: no
Detail Level: Detailed

## 2024-07-08 NOTE — PROCEDURE: SUNSCREEN RECOMMENDATIONS
General Sunscreen Counseling: I recommended a broad spectrum sunscreen with a SPF of 30 or higher.  I explained that SPF 30 sunscreens block approximately 97 percent of the sun's harmful rays.  Sunscreens should be applied at least 15 minutes prior to expected sun exposure and then every 2 hours after that as long as sun exposure continues. If swimming or exercising sunscreen should be reapplied every 45 minutes to an hour after getting wet or sweating.  One ounce, or the equivalent of a shot glass full of sunscreen, is adequate to protect the skin not covered by a bathing suit. I also recommended a lip balm with a sunscreen as well. Sun protective clothing can be used in lieu of sunscreen but must be worn the entire time you are exposed to the sun's rays.
Products Recommended: - recommend applying sunscreen every 2 hours, especially when outdoors: good brands include: Neutrogena, La Roche Posay, & Eucerin 35 (sensitive mineral face with zinc oxide), Blue Lizard, and for tinted: Neutrogena, La Roche Posay, Argelia, Ronald, Elta MD, Eucerin 50 (tinted age defense + HA) & Topix (moisturizing antioxidant sunscreen).\\n- Sun Bum is a great brand I recommend as a body spray for arms, legs, and other exposed surfaces of skin that are not the face & neck.\\n\\n- recommend using UPF clothing for outdoor activities such as swimming, boating, golfing (good brands include Coolibar or [employer])
Detail Level: Detailed

## 2024-07-08 NOTE — PROCEDURE: MIPS QUALITY
Quality 47: Advance Care Plan: Advance Care Planning discussed and documented; advance care plan or surrogate decision maker documented in the medical record.
Quality 226: Preventive Care And Screening: Tobacco Use: Screening And Cessation Intervention: Patient screened for tobacco use and is an ex/non-smoker
Detail Level: Detailed
Name And Contact Information For Health Care Proxy: Primo Wells, son. Phone #: [phone].

## 2024-07-10 NOTE — PROGRESS NOTES
HISTORY OF PRESENT ILLNESS:    Marisa Lin is a 68 y.o. female, No obstetric history on file., No LMP recorded. Patient is postmenopausal.,  presents for a routine exam and has no complaints. Hx of cervical carcinoma in-situ (stage 0), LEEP performed in 1988. Pt's mother took TERRA (diethylstilbestrol) while pregnant with her. Reports recently pap smears have been normal per pt.Pt reports vaginal dryness, desires to restart vaginal estradiol cream. Pt denies breast, urinary or other gyn issues.     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 Was started on Minoxidil. Pt requesting hormonal blood labs for proper management. States it is very difficult to get labs performed at [place] location.    Last Pap: years ago  History of abnormal pap: Hx of cervical carcinoma in-situ (stage 0)  Last Mammogram: 5/22/2024- Negative (benign calcifications noted)  Fam hx of breast or ovarian cancer: No  Last Colonoscopy: 2019- 1 poly removed, repeat colonoscopy due, appt scheduled for consult  Last Dexa: 5/22/2024- osteoporosis (spine) and osteopenia (hip); pt desires restarting prolia with PCP    She is not sexually active.  The patient participates in regular exercise: yes.    The patient does not smoke.    The patient wears seatbelts.     Pt denies any domestic violence.    Past Medical History:   Diagnosis Date    Abnormal Pap smear of cervix     ADD (attention deficit disorder)     Nontoxic single thyroid nodule        Past Surgical History:   Procedure Laterality Date    BIOPSY OF THYROID      FACIAL COSMETIC SURGERY      2017     MEDICATIONS AND ALLERGIES:    Current Outpatient Medications:     ADDERALL 30 mg Tab, Take 30 mg by mouth once daily. 1/2 in the am, 1/2 in the pm daily., Disp: , Rfl:     RETIN-A 0.1 % cream, Apply topically every evening., Disp: 45 g, Rfl: 0    estradioL (ESTRACE) 0.01 % (0.1 mg/gram) vaginal cream, Place 1 g vaginally once daily. Daily for two week then  Monday, Wednesday & Friday at night, Disp: 42.5 g, Rfl: 2    Review of patient's allergies indicates:  No Known Allergies    No family history on file.    Social History     Socioeconomic History    Marital status:    Tobacco Use    Smoking status: Never    Smokeless tobacco: Never     Social Determinants of Health     Financial Resource Strain: Low Risk  (5/23/2024)    Overall Financial Resource Strain (CARDIA)     Difficulty of Paying Living Expenses: Not hard at all   Food Insecurity: No Food Insecurity (5/23/2024)    Hunger Vital Sign     Worried About Running Out of Food in the Last Year: Never true     Ran Out of Food in the Last Year: Never true   Transportation Needs: No Transportation Needs (5/13/2024)    PRAPARE - Transportation     Lack of Transportation (Medical): No     Lack of Transportation (Non-Medical): No   Physical Activity: Sufficiently Active (5/23/2024)    Exercise Vital Sign     Days of Exercise per Week: 5 days     Minutes of Exercise per Session: 40 min   Stress: No Stress Concern Present (5/23/2024)    [employer] - Occupational Stress Questionnaire     Feeling of Stress : Only a little   Housing Stability: Unknown (5/23/2024)    Housing Stability Vital Sign     Unable to Pay for Housing in the Last Year: No     COMPREHENSIVE GYN HISTORY:  PAP History: Denies abnormal Paps.  Infection History: Denies STDs. Denies PID.  Benign History: Denies uterine fibroids. Denies ovarian cysts. Denies endometriosis. Denies other conditions.  Cancer History: Cervical carcin Denies uterine cancer or hyperplasia. Denies ovarian cancer. Denies vulvar cancer or pre-cancer. Denies vaginal cancer or pre-cancer. Denies breast cancer. Denies colon cancer.    ROS:  GENERAL: No weight changes. No swelling. No fatigue. No fever.  CARDIOVASCULAR: No chest pain. No shortness of breath. No leg cramps.   NEUROLOGICAL: No headaches. No vision changes.  BREASTS: No pain. No lumps. No  "discharge.  ABDOMEN: No pain. No nausea. No vomiting. No diarrhea. No constipation.  REPRODUCTIVE: No abnormal bleeding.   VULVA: No pain. No lesions. No itching.  VAGINA: No relaxation. No itching. No odor. No discharge. No lesions. Dryness  URINARY: No incontinence. No nocturia. No frequency. No dysuria.    /84   Ht 5' 2" (1.575 m)   Wt 55.3 kg (121 lb 14.6 oz)   BMI 22.30 kg/m²     PE:  APPEARANCE: Well nourished, well developed, in no acute distress.  AFFECT: WNL, alert and oriented x 3.  SKIN: No acne or hirsutism.  NECK: Neck symmetric, without masses or thyromegaly.  NODES: No inguinal, cervical, axillary or femoral lymph node enlargement.  CHEST: Good respiratory effort.   ABDOMEN: Soft. No tenderness or masses. No hepatosplenomegaly. No hernias.  BREASTS: Symmetrical, no skin changes, visible lesions, palpable masses or nipple discharge bilaterally.  PELVIC: External female genitalia without lesions.  Female hair distribution. Adequate perineal body, Normal urethral meatus. Vaginal atrophy noted, moist without lesions or discharge.  No significant cystocele or rectocele present. Cervix pink without lesions, discharge or tenderness. Uterus is normal, mobile and nontender.  Adnexa without masses or tenderness.  EXTREMITIES: No edema    DIAGNOSIS:  1. Encntr for gyn exam (general) (routine) w abnormal findings    2. Encounter for screening breast examination    3. Androgenic alopecia    4. Vaginal dryness, menopausal    5. Vaginal atrophy      PLAN:  -Pap smear performed today, A.C.S. pap guidelines discussed (no longer required after 20 years of follow up).  -CBE performed today. Mammogram up-to-date. Recommend CBE yearly and encouraged breast self-exam/awareness. Pt verbalized understanding.  -Colonoscopy due this year, pt reports having a consult scheduled for a colonoscopy  -DEXA scan up-to-date; pt desires to restart Prolia, made appt with PCP for tomorrow 3pm  -Androgenic alopecia- pt request " hormonal blood labs to be drawn here- estradiol, free testosterone, and DHEAS; pt will forward results to dermatologist in [place]  -Vaginal dryness: will send vaginal estradiol cream to pharmacy    Orders Placed This Encounter    Testosterone, Free    DHEA-Sulfate    Estradiol    estradioL (ESTRACE) 0.01 % (0.1 mg/gram) vaginal cream     COUNSELING:  A.C.S. pap guidelines discussed (no longer required after 20 years of follow up). Recommend yearly mammograms.     FOLLOW-UP with me annually (7/10/25) and follow up with PCP for other health maintenance.    Kaiden Panchal, ABE, RN, APRN, WHNP-BC [facility]

## 2024-07-11 ENCOUNTER — PATIENT MESSAGE (OUTPATIENT)
Dept: PRIMARY CARE CLINIC | Facility: CLINIC | Age: 69
End: 2024-07-11
Payer: MEDICARE

## 2024-07-11 ENCOUNTER — PATIENT MESSAGE (OUTPATIENT)
Dept: OBSTETRICS AND GYNECOLOGY | Facility: CLINIC | Age: 69
End: 2024-07-11

## 2024-07-11 ENCOUNTER — OFFICE VISIT (OUTPATIENT)
Dept: OBSTETRICS AND GYNECOLOGY | Facility: CLINIC | Age: 69
End: 2024-07-11
Payer: MEDICARE

## 2024-07-11 ENCOUNTER — LAB VISIT (OUTPATIENT)
Dept: LAB | Facility: HOSPITAL | Age: 69
End: 2024-07-11
Payer: MEDICARE

## 2024-07-11 VITALS
HEIGHT: 62 IN | SYSTOLIC BLOOD PRESSURE: 132 MMHG | WEIGHT: 121.94 LBS | DIASTOLIC BLOOD PRESSURE: 84 MMHG | BODY MASS INDEX: 22.44 KG/M2

## 2024-07-11 DIAGNOSIS — Z12.39 ENCOUNTER FOR SCREENING BREAST EXAMINATION: ICD-10-CM

## 2024-07-11 DIAGNOSIS — L64.9 ANDROGENIC ALOPECIA: ICD-10-CM

## 2024-07-11 DIAGNOSIS — N95.1 VAGINAL DRYNESS, MENOPAUSAL: ICD-10-CM

## 2024-07-11 DIAGNOSIS — N95.2 VAGINAL ATROPHY: ICD-10-CM

## 2024-07-11 DIAGNOSIS — Z01.411 ENCNTR FOR GYN EXAM (GENERAL) (ROUTINE) W ABNORMAL FINDINGS: Primary | ICD-10-CM

## 2024-07-11 LAB
DHEA-S SERPL-MCNC: 38.1 UG/DL (ref 33.6–78.9)
ESTRADIOL SERPL-MCNC: <10 PG/ML

## 2024-07-11 PROCEDURE — 84402 ASSAY OF FREE TESTOSTERONE: CPT | Performed by: NURSE PRACTITIONER

## 2024-07-11 PROCEDURE — 82627 DEHYDROEPIANDROSTERONE: CPT | Performed by: NURSE PRACTITIONER

## 2024-07-11 PROCEDURE — 82670 ASSAY OF TOTAL ESTRADIOL: CPT | Performed by: NURSE PRACTITIONER

## 2024-07-11 PROCEDURE — 36415 COLL VENOUS BLD VENIPUNCTURE: CPT | Mod: PN | Performed by: NURSE PRACTITIONER

## 2024-07-11 PROCEDURE — 99999 PR PBB SHADOW E&M-EST. PATIENT-LVL III: CPT | Mod: PBBFAC,,, | Performed by: NURSE PRACTITIONER

## 2024-07-11 RX ORDER — ESTRADIOL 0.1 MG/G
1 CREAM VAGINAL DAILY
Qty: 42.5 G | Refills: 2 | Status: SHIPPED | OUTPATIENT
Start: 2024-07-11

## 2024-07-12 ENCOUNTER — E-CONSULT (OUTPATIENT)
Dept: ENDOCRINOLOGY | Facility: CLINIC | Age: 69
End: 2024-07-12
Payer: MEDICARE

## 2024-07-12 ENCOUNTER — PATIENT MESSAGE (OUTPATIENT)
Dept: PRIMARY CARE CLINIC | Facility: CLINIC | Age: 69
End: 2024-07-12

## 2024-07-12 ENCOUNTER — OFFICE VISIT (OUTPATIENT)
Dept: PRIMARY CARE CLINIC | Facility: CLINIC | Age: 69
End: 2024-07-12
Payer: MEDICARE

## 2024-07-12 VITALS
BODY MASS INDEX: 22.6 KG/M2 | WEIGHT: 122.81 LBS | SYSTOLIC BLOOD PRESSURE: 124 MMHG | OXYGEN SATURATION: 97 % | DIASTOLIC BLOOD PRESSURE: 86 MMHG | HEART RATE: 85 BPM | HEIGHT: 62 IN

## 2024-07-12 DIAGNOSIS — M80.00XD AGE-RELATED OSTEOPOROSIS WITH CURRENT PATHOLOGICAL FRACTURE WITH ROUTINE HEALING: Primary | Chronic | ICD-10-CM

## 2024-07-12 DIAGNOSIS — M81.0 AGE-RELATED OSTEOPOROSIS WITHOUT CURRENT PATHOLOGICAL FRACTURE: Primary | ICD-10-CM

## 2024-07-12 DIAGNOSIS — M40.205 KYPHOSIS OF THORACOLUMBAR REGION, UNSPECIFIED KYPHOSIS TYPE: ICD-10-CM

## 2024-07-12 DIAGNOSIS — L64.9 ANDROGENIC ALOPECIA: ICD-10-CM

## 2024-07-12 DIAGNOSIS — S22.070S COMPRESSION FRACTURE OF T10 VERTEBRA, SEQUELA: ICD-10-CM

## 2024-07-12 DIAGNOSIS — Z78.0 MENOPAUSE: ICD-10-CM

## 2024-07-12 DIAGNOSIS — M80.00XS AGE-RELATED OSTEOPOROSIS WITH CURRENT PATHOLOGICAL FRACTURE, SEQUELA: Primary | ICD-10-CM

## 2024-07-12 PROBLEM — S22.070A COMPRESSION FRACTURE OF T10 VERTEBRA: Status: ACTIVE | Noted: 2024-07-12

## 2024-07-12 PROCEDURE — 99999 PR PBB SHADOW E&M-EST. PATIENT-LVL III: CPT | Mod: PBBFAC,,, | Performed by: STUDENT IN AN ORGANIZED HEALTH CARE EDUCATION/TRAINING PROGRAM

## 2024-07-12 NOTE — LETTER
July 12, 2024    Marisa Lin  [id number]  [facility] [address]             [facility]  Primary Care  [address] [facility] [address]  Phone: [phone]  Fax: [phone]   July 12, 2024     Patient: Marisa Lin   YOB: 1955   Date of Visit: 7/12/2024       To Whom it May Concern:    Marisa Lin was seen in my clinic on 7/12/2024. Due to her current health conditions of spinal osteoporosis and spinal kyphosis she has very limited ability to exercise. Please allow her to terminate her membership early.    Recommend she only do the prescribed PT and home exercises approved by her PT team and Ortho team.    If you have any questions or concerns, please don't hesitate to call.    Sincerely,         Alexsandra Naylor MD

## 2024-07-12 NOTE — PROGRESS NOTES
Marisa Lin  1955        Subjective     Chief Complaint: Osteoporosis    History of Present Illness:  Ms. Marisa Lin is a 68 y.o. female who presents to clinic for Osteoporosis.    DEXA 5/2024-->osteoporosis noted. + lumbar spine. Used to be osteopenia in 2022. Tried Fosamax before, caused GERD and diarrhea.  Used to be on Prolia 5-7 years ago, was enrolled in a study for that.  Was only on it for 2 yrs. No longer on it. Was not put on anything else.   Used to run often, does weight-bearing exercises.    Doing PT and seeing Ortho in [place].  Not in much pain, doing lots of exercises.   No longer running, just walking.    [employer] is $200/month. Needs letter to release her from contract due to this.        Saw Dentist recently.    Review of Systems   Constitutional:  Negative for chills, fever and malaise/fatigue.   HENT:  Negative for congestion.    Respiratory:  Negative for cough.    Cardiovascular:  Negative for leg swelling.   Gastrointestinal:  Negative for nausea and vomiting.   Musculoskeletal:  Positive for back pain.   Neurological:  Negative for sensory change, speech change and focal weakness.   Psychiatric/Behavioral:  The patient is not nervous/anxious.         PAST HISTORY:     Past Medical History:   Diagnosis Date    Abnormal Pap smear of cervix     ADD (attention deficit disorder)     Nontoxic single thyroid nodule        Past Surgical History:   Procedure Laterality Date    BIOPSY OF THYROID      FACIAL COSMETIC SURGERY      2017       No family history on file.      MEDICATIONS & ALLERGIES:     Current Outpatient Medications on File Prior to Visit   Medication Sig    ADDERALL 30 mg Tab Take 30 mg by mouth once daily. 1/2 in the am, 1/2 in the pm daily.    estradioL (ESTRACE) 0.01 % (0.1 mg/gram) vaginal cream Place 1 g vaginally once daily. Daily for two week then Monday, Wednesday & Friday at night    RETIN-A 0.1 % cream Apply topically every evening.     No current  "facility-administered medications on file prior to visit.       Review of patient's allergies indicates:  No Known Allergies    OBJECTIVE:     Vital Signs:  Vitals:    07/12/24 1505   BP: 124/86   BP Location: Left arm   Patient Position: Sitting   BP Method: Medium (Manual)   Pulse: 85   SpO2: 97%   Weight: 55.7 kg (122 lb 12.7 oz)   Height: 5' 2" (1.575 m)       Body mass index is 22.46 kg/m².     Physical Exam:  Physical Exam  Vitals and nursing note reviewed.   Constitutional:       General: She is not in acute distress.     Appearance: Normal appearance. She is not ill-appearing, toxic-appearing or diaphoretic.   HENT:      Head: Normocephalic and atraumatic.   Eyes:      General: No scleral icterus.     Conjunctiva/sclera: Conjunctivae normal.   Pulmonary:      Effort: Pulmonary effort is normal. No respiratory distress.   Neurological:      Mental Status: She is alert and oriented to person, place, and time. Mental status is at baseline.   Psychiatric:         Mood and Affect: Mood normal.         Behavior: Behavior normal.            Laboratory  Lab Results   Component Value Date    GLU 88 05/20/2024     05/20/2024    K 4.5 05/20/2024     05/20/2024    CO2 28 05/20/2024    BUN 20 05/20/2024    CREATININE 1.0 05/20/2024    CALCIUM 9.9 05/20/2024     Lab Results   Component Value Date    HGBA1C 5.5 05/20/2024     No results for input(s): "POCTGLUCOSE" in the last 72 hours.        ASSESSMENT & PLAN:   Ms. Marisa Lin is a 68 y.o. female who was seen today in clinic for osteoporosis. Wants to re-start Prolia. Asking to be ordered [place] be [place] which I discussed may not be possible. Need clearance from dentist. Recently had labs checked. E consult to endo done for recommendations on ordering. Letter sent to cancel gym membership per request.    1. Age-related osteoporosis with current pathological fracture, sequela  -     E-Consult to Endocrinology    2. Compression fracture of T10 " vertebra, sequela    3. Kyphosis of thoracolumbar region, unspecified kyphosis type    4. Menopause    5. Androgenic alopecia             Alexsandra Naylor MD

## 2024-07-12 NOTE — CONSULTS
[facility]  Response for E-Consult     Patient Name: Marisa Lin  MRN: [id number]  Primary Care Provider: Alexsandra Naylor MD   Requesting Provider: Alexsandra Naylor MD  E-Consult to Endocrinology  Consult performed by: Janell Pineda MD  Consult ordered by: Alexsandra Naylor MD          Chart reviewed and with recent DXA with osteoporosis and compression fracture  Treated with Prolia in past and interested in resuming however given compression fracture would consider anabolic agent such as Forteo/Tymlos pending insurance coverage as high risk subsequent fractures    Can be referred to us for further discussion     If she declines would complete secondary work-up with PTH and 24 hour urine collection for calcium  Prolia can be ordered via therapy plan      Lab Results   Component Value Date    THDTGOVB61HM 62 05/20/2024    CALCIUM 9.9 05/20/2024    ALKPHOS 44 (L) 05/20/2024    TSH 1.568 05/20/2024         Contingency that warrants a repeat eConsult or referral: as above    Total time of Consultation: 5 minute    I did not speak to the requesting provider verbally about this.     *This eConsult is based on the clinical data available to me and is furnished without benefit of a physical examination. The eConsult will need to be interpreted in light of any clinical issues or changes in patient status not available to me at the time of filing this eConsults. Significant changes in patient condition or level of acuity should result in immediate formal consultation and reevaluation. Please alert me if you have further questions.    Thank you for this eConsult referral.     Janell L Edwin, MD  [facility]

## 2024-07-13 ENCOUNTER — PATIENT MESSAGE (OUTPATIENT)
Dept: PRIMARY CARE CLINIC | Facility: CLINIC | Age: 69
End: 2024-07-13
Payer: MEDICARE

## 2024-07-15 PROBLEM — M80.00XD AGE-RELATED OSTEOPOROSIS WITH CURRENT PATHOLOGICAL FRACTURE WITH ROUTINE HEALING: Chronic | Status: ACTIVE | Noted: 2024-07-15

## 2024-07-15 LAB — TESTOST FREE SERPL-MCNC: <0.4 PG/ML

## 2024-07-15 NOTE — TELEPHONE ENCOUNTER
Prolia ordered. Pt is aware and understands SE/risks.   Side effects of Prolia include but not limited to: high blood pressures, headache, rash, low phos, low calcium, nausea, vomiting, diarrhea, anemia, muscle weakness, infections, ON or delayed healing of jaw, atypical femur fractures, bone loss/worsening of osteoporosis if stopped.     Recommend about calcium and vitamin D supplements.  Check Vitamin D level, Calcium, Phos prior to starting.  Need clearance from dentist which pt states she already has been given recently.

## 2024-07-16 ENCOUNTER — CLINICAL SUPPORT (OUTPATIENT)
Dept: ENDOSCOPY | Facility: HOSPITAL | Age: 69
End: 2024-07-16
Payer: MEDICARE

## 2024-07-16 ENCOUNTER — TELEPHONE (OUTPATIENT)
Dept: ENDOSCOPY | Facility: HOSPITAL | Age: 69
End: 2024-07-16

## 2024-07-16 DIAGNOSIS — Z12.12 ENCOUNTER FOR COLORECTAL CANCER SCREENING: ICD-10-CM

## 2024-07-16 DIAGNOSIS — Z86.010 HISTORY OF COLON POLYPS: ICD-10-CM

## 2024-07-16 DIAGNOSIS — Z12.11 SCREEN FOR COLON CANCER: Primary | ICD-10-CM

## 2024-07-16 DIAGNOSIS — Z12.11 ENCOUNTER FOR COLORECTAL CANCER SCREENING: ICD-10-CM

## 2024-07-16 RX ORDER — MINOXIDIL 2.5 MG/1
0.62 TABLET ORAL DAILY
COMMUNITY

## 2024-07-16 NOTE — TELEPHONE ENCOUNTER
Spoke to pt to schedule procedure(s) Colonoscopy       Physician to perform procedure(s) Dr. BRENDA Pearce  Date of Procedure (s) 10/10/24  Arrival Time 8:00 AM  Time of Procedure(s) 9:00 AM   Location of Procedure(s) [place] 2nd Floor  Type of Rx Prep sent to patient: PEG  Instructions provided to patient via [facility]    Patient was informed on the following information and verbalized understanding. Screening questionnaire reviewed with patient and complete. If procedure requires anesthesia, a responsible adult needs to be present to accompany the patient home, patient cannot drive after receiving anesthesia. Appointment details are tentative, especially check-in time. Patient will receive a prep-op call 7 days prior to confirm check-in time for procedure. If applicable the patient should contact their pharmacy to verify Rx for procedure prep is ready for pick-up. Patient was advised to call the scheduling department at [phone] if pharmacy states no Rx is available. Patient was advised to call the endoscopy scheduling department if any questions or concerns arise.      [facility] Scheduling Department

## 2024-07-19 ENCOUNTER — TELEPHONE (OUTPATIENT)
Dept: ENDOSCOPY | Facility: HOSPITAL | Age: 69
End: 2024-07-19
Payer: MEDICARE

## 2024-07-19 NOTE — TELEPHONE ENCOUNTER
Spoke to pt to schedule procedure(s) Colonoscopy       Physician to perform procedure(s) Dr. WM Sanderson  Date of Procedure (s) 10/10/24  Arrival Time 12:00 PM  Time of Procedure(s) 1:00 PM   Location of Procedure(s) [place] 2nd Floor  Type of Rx Prep sent to patient: PEG  Instructions provided to patient via [facility]    Patient was informed on the following information and verbalized understanding. Screening questionnaire reviewed with patient and complete. If procedure requires anesthesia, a responsible adult needs to be present to accompany the patient home, patient cannot drive after receiving anesthesia. Appointment details are tentative, especially check-in time. Patient will receive a prep-op call 7 days prior to confirm check-in time for procedure. If applicable the patient should contact their pharmacy to verify Rx for procedure prep is ready for pick-up. Patient was advised to call the scheduling department at [phone] if pharmacy states no Rx is available. Patient was advised to call the endoscopy scheduling department if any questions or concerns arise.      [facility] Scheduling Department

## 2024-08-15 ENCOUNTER — PATIENT MESSAGE (OUTPATIENT)
Dept: PRIMARY CARE CLINIC | Facility: CLINIC | Age: 69
End: 2024-08-15
Payer: MEDICARE

## 2024-08-19 ENCOUNTER — TELEPHONE (OUTPATIENT)
Dept: PRIMARY CARE CLINIC | Facility: CLINIC | Age: 69
End: 2024-08-19
Payer: MEDICARE

## 2024-08-19 NOTE — TELEPHONE ENCOUNTER
ALPESHM for pt, informing her that her infusion was authorized and she can contact the infusion center to schedule.

## 2024-08-19 NOTE — TELEPHONE ENCOUNTER
----- Message from Rose Sue sent at 8/19/2024  9:34 AM CDT -----  Contact: [phone] Patient  Patient is returning a phone call.    Who left a message for the patient: Alina Mario MA    Does patient know what this is regarding:  per pt portal message in regards to Need to schedule Prolia injection Aug 20 or 21. Pt states her [employer] insurance received a request for an authorization. Pt also states infusion advised they are not able to schedule an appt til an authorization is given for the prolia order. Pt states she is her in [place] and would like to schedule the prolia injection appt. A message was also sent to Dr Pineda requesting the order for the prolia.     Would you like a call back, or a response through your [facility] portal?:   call back     Comments:

## 2024-08-19 NOTE — TELEPHONE ENCOUNTER
----- Message from Israel Barnett sent at 8/19/2024  2:33 PM CDT -----  Regarding: Infusion Center  Contact: Pt [phone]  [email or web address]     Patient is calling for Medical Advice regarding: Patient needs urgent callback to discuss infusion center she was supposed to go to. She said the one she called does not have the order. Please call back asap.    How long has patient had these symptoms:    Pharmacy name and phone#:    Patient wants a call back or thru [facility]: Call    Comments:

## 2024-08-20 ENCOUNTER — INFUSION (OUTPATIENT)
Dept: INFUSION THERAPY | Facility: HOSPITAL | Age: 69
End: 2024-08-20
Payer: MEDICARE

## 2024-08-20 ENCOUNTER — OFFICE VISIT (OUTPATIENT)
Dept: UROGYNECOLOGY | Facility: CLINIC | Age: 69
End: 2024-08-20
Payer: MEDICARE

## 2024-08-20 VITALS
BODY MASS INDEX: 22.44 KG/M2 | SYSTOLIC BLOOD PRESSURE: 129 MMHG | DIASTOLIC BLOOD PRESSURE: 84 MMHG | HEIGHT: 62 IN | WEIGHT: 121.94 LBS | HEART RATE: 82 BPM

## 2024-08-20 DIAGNOSIS — M40.209 KYPHOSIS, UNSPECIFIED KYPHOSIS TYPE, UNSPECIFIED SPINAL REGION: ICD-10-CM

## 2024-08-20 DIAGNOSIS — M80.00XD AGE-RELATED OSTEOPOROSIS WITH CURRENT PATHOLOGICAL FRACTURE WITH ROUTINE HEALING: ICD-10-CM

## 2024-08-20 DIAGNOSIS — R19.00 PELVIC FULLNESS: ICD-10-CM

## 2024-08-20 DIAGNOSIS — Z78.0 MENOPAUSE: Primary | ICD-10-CM

## 2024-08-20 DIAGNOSIS — S22.070D COMPRESSION FRACTURE OF T10 VERTEBRA WITH ROUTINE HEALING, SUBSEQUENT ENCOUNTER: ICD-10-CM

## 2024-08-20 DIAGNOSIS — R15.9 FULL INCONTINENCE OF FECES: Primary | ICD-10-CM

## 2024-08-20 DIAGNOSIS — N81.89 PELVIC FLOOR WEAKNESS IN FEMALE: ICD-10-CM

## 2024-08-20 LAB — POC RESIDUAL URINE VOLUME: 0 ML (ref 0–100)

## 2024-08-20 PROCEDURE — 3044F HG A1C LEVEL LT 7.0%: CPT | Mod: CPTII,S$GLB,, | Performed by: OBSTETRICS & GYNECOLOGY

## 2024-08-20 PROCEDURE — 99215 OFFICE O/P EST HI 40 MIN: CPT | Mod: 25,S$GLB,, | Performed by: OBSTETRICS & GYNECOLOGY

## 2024-08-20 PROCEDURE — 3008F BODY MASS INDEX DOCD: CPT | Mod: CPTII,S$GLB,, | Performed by: OBSTETRICS & GYNECOLOGY

## 2024-08-20 PROCEDURE — 96372 THER/PROPH/DIAG INJ SC/IM: CPT

## 2024-08-20 PROCEDURE — 1126F AMNT PAIN NOTED NONE PRSNT: CPT | Mod: CPTII,S$GLB,, | Performed by: OBSTETRICS & GYNECOLOGY

## 2024-08-20 PROCEDURE — 51798 US URINE CAPACITY MEASURE: CPT | Mod: S$GLB,,, | Performed by: OBSTETRICS & GYNECOLOGY

## 2024-08-20 PROCEDURE — 99999 PR PBB SHADOW E&M-EST. PATIENT-LVL IV: CPT | Mod: PBBFAC,,, | Performed by: OBSTETRICS & GYNECOLOGY

## 2024-08-20 PROCEDURE — 1160F RVW MEDS BY RX/DR IN RCRD: CPT | Mod: CPTII,S$GLB,, | Performed by: OBSTETRICS & GYNECOLOGY

## 2024-08-20 PROCEDURE — 1101F PT FALLS ASSESS-DOCD LE1/YR: CPT | Mod: CPTII,S$GLB,, | Performed by: OBSTETRICS & GYNECOLOGY

## 2024-08-20 PROCEDURE — 1159F MED LIST DOCD IN RCRD: CPT | Mod: CPTII,S$GLB,, | Performed by: OBSTETRICS & GYNECOLOGY

## 2024-08-20 PROCEDURE — 3288F FALL RISK ASSESSMENT DOCD: CPT | Mod: CPTII,S$GLB,, | Performed by: OBSTETRICS & GYNECOLOGY

## 2024-08-20 PROCEDURE — 3074F SYST BP LT 130 MM HG: CPT | Mod: CPTII,S$GLB,, | Performed by: OBSTETRICS & GYNECOLOGY

## 2024-08-20 PROCEDURE — 3079F DIAST BP 80-89 MM HG: CPT | Mod: CPTII,S$GLB,, | Performed by: OBSTETRICS & GYNECOLOGY

## 2024-08-20 PROCEDURE — 63600175 PHARM REV CODE 636 W HCPCS: Mod: JZ,JG | Performed by: STUDENT IN AN ORGANIZED HEALTH CARE EDUCATION/TRAINING PROGRAM

## 2024-08-20 PROCEDURE — 99459 PELVIC EXAMINATION: CPT | Mod: S$GLB,,, | Performed by: OBSTETRICS & GYNECOLOGY

## 2024-08-20 RX ADMIN — DENOSUMAB 60 MG: 60 INJECTION SUBCUTANEOUS at 02:08

## 2024-08-20 NOTE — PROGRESS NOTES
Chief Complaint   Patient presents with    Pelvic floor weaknes        HPI: Patient is a 68 y.o. female  who presents today with c/o urinary urgency and frequency. Patient states that she wakes a couple of times per night to void. Has woken up to 4 times. She reports that she is unsure if she being woken by the urge or if she is already awake. She denies daytime urinary urgency and frequency. She voids about 3-4 hours to urinate. She denies urinary urgency and stress incontinence. Denies loss of control of her bladder during a [place].     Patient states that her more worrisome/ bothersome complaint is accidental bowel leakage. 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 In May she did the the [place] half marathon and she had no trouble. She did stop all intake three hours before the race and wore an adult diaper and did not have any leakage.   She does not otherwise experience accidental bowel leakage. If she has too much coffee and goes for a walk or run she could have some cramping and fecal urgency but will not leak stool. Reports that if she has not had a bowel movement the morning by mile two she can experience a lot of intestinal cramping.     She was diagnosed with osteoporosis of the spine and osteopenia of the hips. Has a Prolia shot scheduled for today. Was informed that she has a compression fracture of the spine and should not run for sometime. Does not plan to run until February. Advised to do PT, but unable to do pelvic floor PT as she lives in [place] and PT is not covered by insurance. She also is uncertain how PT would occur at her local PT practice given it is an open space.      Reports her last colonoscopy was in  which showed a polyp and hemorrhoid and has one coming up in 10/2024.       Review of Systems   Constitutional:  Negative for  activity change, appetite change, chills, fatigue, fever and unexpected weight change.   HENT:  Negative for nasal congestion, dental problem, hearing loss, mouth dryness and sore throat.    Eyes:  Negative for pain and eye dryness.   Respiratory:  Negative for cough, shortness of breath and wheezing.    Cardiovascular:  Negative for chest pain, palpitations and leg swelling.   Gastrointestinal:  Negative for abdominal distention, abdominal pain, blood in stool, constipation and rectal pain.   Endocrine: Negative for cold intolerance and heat intolerance.   Genitourinary:  Negative for dyspareunia, hot flashes and vaginal dryness.   Musculoskeletal:  Negative for arthralgias, back pain, gait problem, joint swelling, myalgias, neck pain and neck stiffness.   Integumentary:  Negative for rash.   Neurological:  Negative for dizziness, tremors, seizures, speech difficulty, weakness, light-headedness, numbness and headaches.   Psychiatric/Behavioral:  Negative for confusion, dysphoric mood and sleep disturbance. The patient is not nervous/anxious.         Past Medical History:   Diagnosis Date    Abnormal Pap smear of cervix     ADD (attention deficit disorder)     Compression fracture of T10 vertebra     Kyphosis     Nontoxic single thyroid nodule     Osteoporosis        Past Surgical History:   Procedure Laterality Date    BIOPSY OF THYROID      CERVICAL BIOPSY      CIS    COLONOSCOPY      2015    FACIAL COSMETIC SURGERY      2017         Current Outpatient Medications:     ADDERALL 30 mg Tab, Take 30 mg by mouth once daily. 1/2 in the am, 1/2 in the pm daily., Disp: , Rfl:     denosumab (PROLIA SUBQ), Inject into the skin., Disp: , Rfl:     estradioL (ESTRACE) 0.01 % (0.1 mg/gram) vaginal cream, Place 1 g vaginally once daily. Daily for two week then Monday, Wednesday & Friday at night, Disp: 42.5 g, Rfl: 2    minoxidiL (LONITEN) 2.5 MG tablet, Take 0.625 mg by mouth once daily., Disp: , Rfl:     RETIN-A 0.1 % cream,  Apply topically every evening., Disp: 45 g, Rfl: 0    Review of patient's allergies indicates:  No Known Allergies    Family History   Problem Relation Name Age of Onset    Kidney disease Mother      Suicide Mother      Heart disease Father      Osteoporosis Maternal Grandmother         Social History     Socioeconomic History    Marital status:    Tobacco Use    Smoking status: Never    Smokeless tobacco: Never    Tobacco comments:        Substance and Sexual Activity    Alcohol use: Not Currently    Drug use: Never    Sexual activity: Not Currently   Social History Narrative    Lives alone. Son lives in the same apartment building. Other son and grandson lives close. Comes to [facility] once per month. A son's business is in [facility]. Siblings live here as well.      Social Determinants of Health     Financial Resource Strain: Low Risk  (2024)    Overall Financial Resource Strain (CARDIA)     Difficulty of Paying Living Expenses: Not hard at all   Food Insecurity: No Food Insecurity (2024)    Hunger Vital Sign     Worried About Running Out of Food in the Last Year: Never true     Ran Out of Food in the Last Year: Never true   Transportation Needs: No Transportation Needs (2024)    PRAPARE - Transportation     Lack of Transportation (Medical): No     Lack of Transportation (Non-Medical): No   Physical Activity: Sufficiently Active (2024)    Exercise Vital Sign     Days of Exercise per Week: 5 days     Minutes of Exercise per Session: 40 min   Stress: No Stress Concern Present (2024)    [employer] - Occupational Stress Questionnaire     Feeling of Stress : Only a little   Housing Stability: Unknown (2024)    Housing Stability Vital Sign     Unable to Pay for Housing in the Last Year: No       OB History          2    Para   2    Term   2            AB        Living   2         SAB        IAB        Ectopic        Multiple        Live  Births               Obstetric Comments    x 2  Had an episiotomy               Gyn History    Mammogram: none on file, ordered 24 by Dr. Naylor; Patient did it elsewhere  Pap smear: none on file. Historically normal per patient  LMP: No LMP recorded. Patient is postmenopausal.   Postmenopausal bleeding: denies      INITIAL PHYSICAL EXAMINATION    Vitals:    24 1005   BP: 129/84   Pulse: 82      General: Healthy in appearance, Well nourished, Affect Normal, NAD.  Neck: Supple, No masses, Trachea midline, Thyroid normal size,  non-tender, no masses or nodules.  Nodes: No clavicular/cervical adenopathy.  Skin: Normal temperature, No atypical lesions or rash.  Heart: Normal sounds, no murmurs  Lungs: Normal respiratory effort.  Gastrointestinal: Non tender, Non distended, No masses, guarding or  rebound, No hepatosplenomegally, No hernia.  Ext: No clubbing, cyanosis, edema or varicosities.  DTR's: 2+ bilaterally  Strength 5/5 bilateral upper and lower extremities    Genitourinary- (Verbal consent obtained; Female chaperone present during the pelvic exam)    Vulva: normal without lesions, masses, atrophy or pain  Urethra: meatus central and normal in appearance, no prolapse/caruncle, no masses or discharge. Empty supine stress test was negative.  Bladder: non-tender, no masses  Vagina: No discharge or lesions, moderate atrophy, no masses appreciated.  [See POP-Q]  Cervix: no lesions, no discharge  Uterus:  non-tender, anteverted, approximately 5 weeks size  Adnexa: no masses, non tender.  Rectal: Normal tone and anorectal angle  Neuro: S2-4- pin prick and light touch intact, Anal wink present  Levator strength: 1/5  Levator tenderness: left 0/10 and right 0/10    POP-Q Exam- pelvic organ prolapse quantitative    Aa -3  Anterior Wall Ba -3  Anterior wall C -6  Cervix or cuff   Gh 3  Genital hiatus pb 3  perineal body tvl 9  Total vaginal length   Ap -3  Posterior wall Bp -3  Posterior wall D -9  Posterior  fornix     with Uterus    POP-Q Stage:0      Office Visit on 08/20/2024   Component Date Value Ref Range Status    POC Residual Urine Volume 08/20/2024 0  0 - 100 mL Final        ASSESSMENT & PLAN:    Full incontinence of feces  -     Ambulatory referral/consult to Physical/Occupational Therapy; Future; Expected date: 08/27/2024    Pelvic floor weakness in female  -     Ambulatory referral/consult to Urogynecology  -     POCT Bladder Scan    Pelvic fullness  -     US Pelvis Complete Non OB; Future; Expected date: 08/20/2024       67 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 We reviewed today that there is a phenomena for runners developing abdominal cramping, diarrhea and accidental bowel leakage. Reviewed at length the mechanism of action of the process with colonic stimulation and changes in intestinal blood flow. Provided a handout that offered dietary modifications prior to a race that could help such as a lower fiber diet, avoiding caffeine and artifical sweeteners. Reviewed that the strength of her pelvic floor may be a little weak and that we can try pelvic floor PT. Reviewed that PT in [place] is often covered. She was referred to [facility]. Discussed that PT is performed in a quiet and private setting. Discussed that they would give her a HEP to maintain as well. She was provided information about Leva to read in case she was interested in purchasing it should the PT also feel that it could help with strength training her pelvic floor muscles. She iwll return in about 3 months for re-evaluation of symptoms.     All questions were answered today. The patient was encouraged to contact the office as needed with any additional questions or concerns.     Total time spent on visit was 60 minutes.  This includes face to face time and non-face to face time preparing to see the patient (eg, review of tests), Obtaining and/or reviewing separately  obtained history, Documenting clinical information in the electronic or other health record, Independently interpreting resultsand communicating results to the patient/family/caregiver, or Care coordination.    Destiny Dallas MD

## 2024-08-20 NOTE — NURSING
Pt here for prolia injection.  Ca++ 9.9.  Pt endorses taking Ca++ and vitamin D supplement at home.  Denies jaw pain or invasive dental procedures.  Prolia administered to abdominal tissue.  Pt tolerated.

## 2024-08-21 ENCOUNTER — HOSPITAL ENCOUNTER (OUTPATIENT)
Dept: RADIOLOGY | Facility: OTHER | Age: 69
Discharge: HOME | End: 2024-08-21
Attending: OBSTETRICS & GYNECOLOGY
Payer: MEDICARE

## 2024-08-21 DIAGNOSIS — R19.00 PELVIC FULLNESS: ICD-10-CM

## 2024-08-21 PROCEDURE — 76856 US EXAM PELVIC COMPLETE: CPT | Mod: 26,,, | Performed by: RADIOLOGY

## 2024-08-21 PROCEDURE — 76830 TRANSVAGINAL US NON-OB: CPT | Mod: TC

## 2024-08-21 PROCEDURE — 76856 US EXAM PELVIC COMPLETE: CPT | Mod: TC

## 2024-08-21 PROCEDURE — 76830 TRANSVAGINAL US NON-OB: CPT | Mod: 26,,, | Performed by: RADIOLOGY

## 2024-10-02 ENCOUNTER — TELEPHONE (OUTPATIENT)
Dept: ENDOSCOPY | Facility: HOSPITAL | Age: 69
End: 2024-10-02
Payer: MEDICARE

## 2024-10-02 NOTE — TELEPHONE ENCOUNTER
Spoke to patient for pre-call to confirm scheduled Colonoscopy and patient verbalized understanding of the following:       Date of Procedure (s)  verified 10/10/24  Arrival Time 10:15 AM verified.  Location of Procedure(s) [place] 2nd Floor verified.  NPO status reinforced. Ok to continue clear liquids up until 4 hours prior to the Endoscopy procedure.   Patient denies use of blood thinners, GLP-1 medications, and weight loss medications.  Pt confirmed receipt of prep instructions and Rx prep (if applicable).  Instructions provided to patient via [facility]  Pt confirmed ride home after procedure if procedure requires anesthesia.   Pre-call screening questionnaire reviewed and completed with patient.   Appointment details are tentative, including check-in time.  If the patient begins taking any blood thinning medications, injectable weight loss/diabetes medications (other than insulin), or Adipex (phentermine) patient was instructed to contact the endoscopy scheduling department as soon as possible.  Patient was advised to call the endoscopy scheduling department if any questions or concerns arise.       [facility] Scheduling Department

## 2024-10-07 ENCOUNTER — ANESTHESIA EVENT (OUTPATIENT)
Dept: ENDOSCOPY | Facility: HOSPITAL | Age: 69
End: 2024-10-07
Payer: MEDICARE

## 2024-10-07 NOTE — ANESTHESIA PREPROCEDURE EVALUATION
10/07/2024  Marisa Lin is a 68 y.o., female here for a colonoscopy.     Past Medical History:   Diagnosis Date    Abnormal Pap smear of cervix     ADD (attention deficit disorder)     Compression fracture of T10 vertebra     Kyphosis     Nontoxic single thyroid nodule     Osteoporosis      Past Surgical History:   Procedure Laterality Date    BIOPSY OF THYROID      CERVICAL BIOPSY      CIS    COLONOSCOPY      2015    FACIAL COSMETIC SURGERY      2017       Pre-op Assessment    I have reviewed the Patient Summary Reports.     I have reviewed the Nursing Notes. I have reviewed the NPO Status.   I have reviewed the Medications.     Review of Systems  Anesthesia Hx:               Denies Personal Hx of Anesthesia complications.                    Social:  Non-Smoker, No Alcohol Use       Hematology/Oncology:  Hematology Normal   Oncology Normal                                   EENT/Dental:  EENT/Dental Normal           Cardiovascular:  Cardiovascular Normal                                            Pulmonary:  Pulmonary Normal                       Renal/:  Renal/ Normal                 Hepatic/GI:  Hepatic/GI Normal                 Musculoskeletal:  Musculoskeletal Normal                Neurological:  Neurology Normal                                      Endocrine:  Endocrine Normal            Dermatological:  Skin Normal    Psych:  Psychiatric History                  Physical Exam  General: Well nourished, Cooperative, Alert and Oriented    Airway:  Mallampati: I   Mouth Opening: Normal  TM Distance: Normal  Tongue: Normal  Neck ROM: Normal ROM    Dental:  Intact    Chest/Lungs:  Clear to auscultation, Normal Respiratory Rate    Heart:  Rate: Normal  Rhythm: Regular Rhythm        Anesthesia Plan  Type of Anesthesia, risks & benefits discussed:    Anesthesia Type: Gen Natural Airway  Intra-op  Monitoring Plan: Standard ASA Monitors  Induction:  IV  Informed Consent: Informed consent signed with the Patient and all parties understand the risks and agree with anesthesia plan.  All questions answered.   ASA Score: 1  Day of Surgery Review of History & Physical: H&P Update referred to the surgeon/provider.    Ready For Surgery From Anesthesia Perspective.     .

## 2024-10-09 ENCOUNTER — PATIENT MESSAGE (OUTPATIENT)
Dept: ADMINISTRATIVE | Facility: HOSPITAL | Age: 69
End: 2024-10-09
Payer: MEDICARE

## 2024-10-10 ENCOUNTER — ANESTHESIA (OUTPATIENT)
Dept: ENDOSCOPY | Facility: HOSPITAL | Age: 69
End: 2024-10-10
Payer: MEDICARE

## 2024-10-10 ENCOUNTER — HOSPITAL ENCOUNTER (OUTPATIENT)
Facility: HOSPITAL | Age: 69
Discharge: HOME | End: 2024-10-10
Attending: INTERNAL MEDICINE | Admitting: INTERNAL MEDICINE
Payer: MEDICARE

## 2024-10-10 VITALS
WEIGHT: 121 LBS | HEIGHT: 62 IN | DIASTOLIC BLOOD PRESSURE: 63 MMHG | HEART RATE: 70 BPM | OXYGEN SATURATION: 97 % | BODY MASS INDEX: 22.26 KG/M2 | RESPIRATION RATE: 18 BRPM | SYSTOLIC BLOOD PRESSURE: 100 MMHG | TEMPERATURE: 98 F

## 2024-10-10 DIAGNOSIS — Z86.0100 HISTORY OF COLON POLYPS: Primary | ICD-10-CM

## 2024-10-10 PROCEDURE — 94761 N-INVAS EAR/PLS OXIMETRY MLT: CPT

## 2024-10-10 PROCEDURE — 37000009 HC ANESTHESIA EA ADD 15 MINS: Performed by: INTERNAL MEDICINE

## 2024-10-10 PROCEDURE — 37000008 HC ANESTHESIA 1ST 15 MINUTES: Performed by: INTERNAL MEDICINE

## 2024-10-10 PROCEDURE — 63600175 PHARM REV CODE 636 W HCPCS: Performed by: NURSE ANESTHETIST, CERTIFIED REGISTERED

## 2024-10-10 PROCEDURE — 25000003 PHARM REV CODE 250: Performed by: NURSE ANESTHETIST, CERTIFIED REGISTERED

## 2024-10-10 PROCEDURE — 99900035 HC TECH TIME PER 15 MIN (STAT)

## 2024-10-10 PROCEDURE — G0105 COLORECTAL SCRN; HI RISK IND: HCPCS | Performed by: INTERNAL MEDICINE

## 2024-10-10 PROCEDURE — A4216 STERILE WATER/SALINE, 10 ML: HCPCS | Performed by: NURSE ANESTHETIST, CERTIFIED REGISTERED

## 2024-10-10 PROCEDURE — G0105 COLORECTAL SCRN; HI RISK IND: HCPCS | Mod: ,,, | Performed by: INTERNAL MEDICINE

## 2024-10-10 RX ORDER — PROPOFOL 10 MG/ML
VIAL (ML) INTRAVENOUS CONTINUOUS PRN
Status: DISCONTINUED | OUTPATIENT
Start: 2024-10-10 | End: 2024-10-10

## 2024-10-10 RX ORDER — SODIUM CHLORIDE 0.9 % (FLUSH) 0.9 %
SYRINGE (ML) INJECTION
Status: DISCONTINUED | OUTPATIENT
Start: 2024-10-10 | End: 2024-10-10

## 2024-10-10 RX ORDER — SODIUM CHLORIDE 9 MG/ML
INJECTION, SOLUTION INTRAVENOUS CONTINUOUS
Status: DISCONTINUED | OUTPATIENT
Start: 2024-10-10 | End: 2024-10-10 | Stop reason: HOSPADM

## 2024-10-10 RX ORDER — PROPOFOL 10 MG/ML
VIAL (ML) INTRAVENOUS
Status: DISCONTINUED | OUTPATIENT
Start: 2024-10-10 | End: 2024-10-10

## 2024-10-10 RX ORDER — LIDOCAINE HYDROCHLORIDE 20 MG/ML
INJECTION INTRAVENOUS
Status: DISCONTINUED | OUTPATIENT
Start: 2024-10-10 | End: 2024-10-10

## 2024-10-10 RX ADMIN — LIDOCAINE HYDROCHLORIDE 50 MG: 20 INJECTION INTRAVENOUS at 11:10

## 2024-10-10 RX ADMIN — Medication 10 ML: at 11:10

## 2024-10-10 RX ADMIN — PROPOFOL 100 MG: 10 INJECTION, EMULSION INTRAVENOUS at 11:10

## 2024-10-10 RX ADMIN — PROPOFOL 140 MCG/KG/MIN: 10 INJECTION, EMULSION INTRAVENOUS at 11:10

## 2024-10-10 NOTE — ANESTHESIA POSTPROCEDURE EVALUATION
Anesthesia Post Evaluation    Patient: Marisa Lin    Procedure(s) Performed: Procedure(s) (LRB):  COLONOSCOPY (N/A)    Final Anesthesia Type: general      Patient location during evaluation: GI PACU  Patient participation: Yes- Able to Participate  Level of consciousness: awake and alert, oriented and awake  Post-procedure vital signs: reviewed and stable  Pain management: adequate  Airway patency: patent    PONV status at discharge: No PONV  Anesthetic complications: no      Cardiovascular status: stable  Respiratory status: unassisted and room air  Hydration status: euvolemic  Follow-up not needed.              Vitals Value Taken Time   /63 10/10/24 1150   Temp 36.6 °C (97.8 °F) 10/10/24 1125   Pulse 70 10/10/24 1150   Resp 18 10/10/24 1150   SpO2 97 % 10/10/24 1150         Event Time   Out of Recovery 11:40:00         Pain/Jyothi Score: Jyothi Score: 10 (10/10/2024 11:40 AM)

## 2024-10-10 NOTE — PROVATION PATIENT INSTRUCTIONS
Discharge Summary/Instructions after an Endoscopic Procedure  Patient Name: Marisa Lin  Patient MRN: [id number]  Patient YOB: 1955  Thursday, October 10, 2024  Buster Sanderson MD  Dear patient,  As a result of recent federal legislation (The Federal Cures Act), you may   receive lab or pathology results from your procedure in your [facility]   account before your physician is able to contact you. Your physician or   their representative will relay the results to you with their   recommendations at their soonest availability.  Thank you,  RESTRICTIONS:  During your procedure today, you received medications for sedation.  These   medications may affect your judgment, balance and coordination.  Therefore,   for 24 hours, you have the following restrictions:   - DO NOT drive a car, operate machinery, make legal/financial decisions,   sign important papers or drink alcohol.    ACTIVITY:  Today: no heavy lifting, straining or running due to procedural   sedation/anesthesia.  The following day: return to full activity including work.  DIET:  Eat and drink normally unless instructed otherwise.     TREATMENT FOR COMMON SIDE EFFECTS:  - Mild abdominal pain, nausea, belching, bloating or excessive gas:  rest,   eat lightly and use a heating pad.  - Sore Throat: treat with throat lozenges and/or gargle with warm salt   water.  - Because air was used during the procedure, expelling large amounts of air   from your rectum or belching is normal.  - If a bowel prep was taken, you may not have a bowel movement for 1-3 days.    This is normal.  SYMPTOMS TO WATCH FOR AND REPORT TO YOUR PHYSICIAN:  1. Abdominal pain or bloating, other than gas cramps.  2. Chest pain.  3. Back pain.  4. Signs of infection such as: chills or fever occurring within 24 hours   after the procedure.  5. Rectal bleeding, which would show as bright red, maroon, or black stools.   (A tablespoon of blood from the rectum is not serious,  especially if   hemorrhoids are present.)  6. Vomiting.  7. Weakness or dizziness.  GO DIRECTLY TO THE NEAREST EMERGENCY ROOM IF YOU HAVE ANY OF THE FOLLOWING:      Difficulty breathing              Chills and/or fever over 101 F   Persistent vomiting and/or vomiting blood   Severe abdominal pain   Severe chest pain   Black, tarry stools   Bleeding- more than one tablespoon   Any other symptom or condition that you feel may need urgent attention  Your doctor recommends these additional instructions:  If any biopsies were taken, your doctors clinic will contact you in 1 to 2   weeks with any results.  - Patient has a contact number available for emergencies.  The signs and   symptoms of potential delayed complications were discussed with the   patient.  Return to normal activities tomorrow.  Written discharge   instructions were provided to the patient.   - Discharge patient to home.   - Resume previous diet.   - Continue present medications.   - Repeat colonoscopy is not recommended for surveillance.   For questions, problems or results please call your physician - Buster Sanderson MD at Work:  [phone].  [facility], EMERGENCY ROOM PHONE NUMBER: [phone]  IF A COMPLICATION OR EMERGENCY SITUATION ARISES AND YOU ARE UNABLE TO REACH   YOUR PHYSICIAN - GO DIRECTLY TO THE EMERGENCY ROOM.  Buster Sanderson MD  10/10/2024 11:25:02 AM  This report has been verified and signed electronically.  Dear patient,  As a result of recent federal legislation (The Federal Cures Act), you may   receive lab or pathology results from your procedure in your [facility]   account before your physician is able to contact you. Your physician or   their representative will relay the results to you with their   recommendations at their soonest availability.  Thank you,  PROVATION

## 2024-10-10 NOTE — TRANSFER OF CARE
"Anesthesia Transfer of Care Note    Patient: Marisa Lin    Procedure(s) Performed: Procedure(s) (LRB):  COLONOSCOPY (N/A)    Patient location: PACU    Anesthesia Type: general    Transport from OR: Transported from OR on 6-10 L/min O2 by face mask with adequate spontaneous ventilation    Post pain: adequate analgesia    Post assessment: no apparent anesthetic complications    Post vital signs: stable    Level of consciousness: sedated    Nausea/Vomiting: no nausea/vomiting    Complications: none    Transfer of care protocol was followed      Last vitals: Visit Vitals  /81 (BP Location: Left arm, Patient Position: Lying)   Pulse 72   Temp 36.6 °C (97.9 °F) (Temporal)   Resp 16   Ht 5' 2" (1.575 m)   Wt 54.9 kg (121 lb)   SpO2 100%   Breastfeeding No   BMI 22.13 kg/m²     "

## 2024-10-10 NOTE — H&P
Short Stay Endoscopy History and Physical    PCP - Alexsandra Naylor MD    Procedure - Colonoscopy  Sedation: GA  ASA - per anesthesia  Mallampati - per anesthesia  History of Anesthesia problems - no  Family history Anesthesia problems -  no     HPI:  This is a 68 y.o. female here for evaluation of : History of colon polyps    Reflux - no  Dysphagia - no  Abdominal pain - no  Diarrhea - no    ROS:  Constitutional: No fevers, chills, No weight loss  ENT: No allergies  CV: No chest pain  Pulm: No cough, No shortness of breath  Ophtho: No vision changes  GI: see HPI  Medical History:  has a past medical history of Abnormal Pap smear of cervix, ADD (attention deficit disorder), Compression fracture of T10 vertebra, Kyphosis, Nontoxic single thyroid nodule, and Osteoporosis.    Surgical History:  has a past surgical history that includes Facial cosmetic surgery; Biopsy of thyroid; Cervical biopsy; and Colonoscopy.    Family History: family history includes Heart disease in her father; Kidney disease in her mother; Osteoporosis in her maternal grandmother; Suicide in her mother.. Otherwise no colon cancer, inflammatory bowel disease, or GI malignancies.    Social History:  reports that she has never smoked. She has never used smokeless tobacco. She reports that she does not currently use alcohol. She reports that she does not use drugs.    Review of patient's allergies indicates:  No Known Allergies    Medications:   Medications Prior to Admission   Medication Sig Dispense Refill Last Dose/Taking    ADDERALL 30 mg Tab Take 30 mg by mouth once daily. 1/2 in the am, 1/2 in the pm daily.       denosumab (PROLIA SUBQ) Inject into the skin.       estradioL (ESTRACE) 0.01 % (0.1 mg/gram) vaginal cream Place 1 g vaginally once daily. Daily for two week then Monday, Wednesday & Friday at night 42.5 g 2     minoxidiL (LONITEN) 2.5 MG tablet Take 0.625 mg by mouth once daily.       RETIN-A 0.1 % cream Apply topically every evening. 45  g 0        Objective Findings:    Vital Signs: Per nursing notes.    Physical Exam:  General Appearance: Well appearing in no acute distress  Head:   Normocephalic, without obvious abnormality  Eyes:    No scleral icterus  Airway: Open  Neck: No restriction in mobility  Lungs: CTA bilaterally in anterior and posterior fields, no wheezes, no crackles.  Heart:  Regular rate and rhythm, S1, S2 normal, no murmurs heard  Abdomen: Soft, non tender, non distended      Labs:  Lab Results   Component Value Date    WBC 5.14 05/20/2024    HGB 14.8 05/20/2024    HCT 44.5 05/20/2024     05/20/2024    CHOL 268 (H) 05/20/2024    TRIG 62 05/20/2024     (H) 05/20/2024    ALT 27 05/20/2024    AST 31 05/20/2024     05/20/2024    K 4.5 05/20/2024     05/20/2024    CREATININE 1.0 05/20/2024    BUN 20 05/20/2024    CO2 28 05/20/2024    TSH 1.568 05/20/2024    HGBA1C 5.5 05/20/2024         I have explained the risks and benefits of endoscopy procedures to the patient including but not limited to bleeding, perforation, infection, and death.    Thank you so much for allowing me to participate in the care of Marisa Sanderson MD

## 2025-02-04 ENCOUNTER — APPOINTMENT (OUTPATIENT)
Dept: URBAN - METROPOLITAN AREA CLINIC 15 | Facility: CLINIC | Age: 70
Setting detail: DERMATOLOGY
End: 2025-02-04

## 2025-02-04 VITALS — HEIGHT: 61.81 IN | WEIGHT: 117 LBS

## 2025-02-04 DIAGNOSIS — L64.8 OTHER ANDROGENIC ALOPECIA: ICD-10-CM | Status: IMPROVED

## 2025-02-04 PROCEDURE — 99214 OFFICE O/P EST MOD 30 MIN: CPT

## 2025-02-04 PROCEDURE — ? COUNSELING

## 2025-02-04 PROCEDURE — ? ADDITIONAL NOTES

## 2025-02-04 PROCEDURE — ? PRESCRIPTION

## 2025-02-04 PROCEDURE — ? PRESCRIPTION MEDICATION MANAGEMENT

## 2025-02-04 RX ORDER — MINOXIDIL 2.5 MG/1
1 TABLET ORAL AS DIRECTED
Qty: 30 | Refills: 2 | Status: ERX

## 2025-02-04 ASSESSMENT — SEVERITY OF ALOPECIA TOOL: % SCALP HAIR LOST: 35

## 2025-02-04 ASSESSMENT — LOCATION DETAILED DESCRIPTION DERM: LOCATION DETAILED: RIGHT SUPERIOR PARIETAL SCALP

## 2025-02-04 ASSESSMENT — LOCATION ZONE DERM: LOCATION ZONE: SCALP

## 2025-02-04 ASSESSMENT — LOCATION SIMPLE DESCRIPTION DERM: LOCATION SIMPLE: SCALP

## 2025-02-04 NOTE — PROCEDURE: PRESCRIPTION MEDICATION MANAGEMENT
Modify Regimen: .\\n\\n- Minoxidil 2.5 mg tablet (increasing from 1/4 tab)- Take half of a tablet by mouth once daily. Patient advised to measure blood pressure 30 minutes before and after taking it.
Detail Level: Zone
Render In Strict Bullet Format?: No

## 2025-02-04 NOTE — PROCEDURE: ADDITIONAL NOTES
Render Risk Assessment In Note?: no
Additional Notes: .\\n\\n- Hair growth clinically seen in office today. \\n- Patient uses red-light helmet for scalp at home.
Detail Level: Detailed

## 2025-02-04 NOTE — PROCEDURE: COUNSELING
Minoxidil 5% Topical Foam Recommendations: Can purchase OTC at [facility], [facility], or [facility].
Detail Level: Zone

## 2025-05-12 ENCOUNTER — TELEPHONE (OUTPATIENT)
Dept: PRIMARY CARE CLINIC | Facility: CLINIC | Age: 70
End: 2025-05-12
Payer: MEDICARE

## 2025-05-12 DIAGNOSIS — Z00.00 ANNUAL PHYSICAL EXAM: Primary | ICD-10-CM

## 2025-05-12 DIAGNOSIS — Z78.0 MENOPAUSE: ICD-10-CM

## 2025-05-12 DIAGNOSIS — Z13.6 ENCOUNTER FOR SCREENING FOR CARDIOVASCULAR DISORDERS: ICD-10-CM

## 2025-05-12 DIAGNOSIS — L64.9 ANDROGENIC ALOPECIA: ICD-10-CM

## 2025-05-12 DIAGNOSIS — M80.00XD AGE-RELATED OSTEOPOROSIS WITH CURRENT PATHOLOGICAL FRACTURE WITH ROUTINE HEALING: Chronic | ICD-10-CM

## 2025-05-12 DIAGNOSIS — R79.89 OTHER SPECIFIED ABNORMAL FINDINGS OF BLOOD CHEMISTRY: ICD-10-CM

## 2025-05-12 DIAGNOSIS — Z78.9 VEGETARIAN DIET: ICD-10-CM

## 2025-05-12 DIAGNOSIS — M81.0 AGE-RELATED OSTEOPOROSIS WITHOUT CURRENT PATHOLOGICAL FRACTURE: ICD-10-CM

## 2025-05-12 NOTE — TELEPHONE ENCOUNTER
----- Message from Le sent at 5/12/2025  2:15 PM CDT -----  Contact: [phone] Patient  type: Alma is requesting to schedule their Lab appointment prior to an appointment.Order is not listed in EPIC.  Please enter order and contact patient to schedule.Preferred Date and Time of Labs: 07 08 2025 10:30 AM Date of Appointment:07 15 2025Where would they like the lab performed?[facility] Would the patient rather a call back or a response via [facility]? Call Best Call Back Number: [phone] Information:Patient would like everything tested before once before . Patient would like estrogen and testosterone

## 2025-05-26 DIAGNOSIS — N95.1 VAGINAL DRYNESS, MENOPAUSAL: ICD-10-CM

## 2025-05-26 DIAGNOSIS — L98.8 WRINKLES: ICD-10-CM

## 2025-05-26 RX ORDER — TRETINOIN 1 MG/G
CREAM TOPICAL NIGHTLY
Qty: 45 G | Refills: 0 | Status: CANCELLED | OUTPATIENT
Start: 2025-05-26

## 2025-05-26 RX ORDER — ESTRADIOL 0.1 MG/G
1 CREAM VAGINAL DAILY
Qty: 42.5 G | Refills: 2 | Status: SHIPPED | OUTPATIENT
Start: 2025-05-26

## 2025-05-27 ENCOUNTER — HOSPITAL ENCOUNTER (OUTPATIENT)
Dept: RADIOLOGY | Facility: HOSPITAL | Age: 70
Discharge: HOME | End: 2025-05-27
Attending: STUDENT IN AN ORGANIZED HEALTH CARE EDUCATION/TRAINING PROGRAM
Payer: MEDICARE

## 2025-05-27 ENCOUNTER — RESULTS FOLLOW-UP (OUTPATIENT)
Dept: PRIMARY CARE CLINIC | Facility: CLINIC | Age: 70
End: 2025-05-27

## 2025-05-27 DIAGNOSIS — M81.0 AGE-RELATED OSTEOPOROSIS WITHOUT CURRENT PATHOLOGICAL FRACTURE: ICD-10-CM

## 2025-05-27 DIAGNOSIS — L64.9 ANDROGENIC ALOPECIA: ICD-10-CM

## 2025-05-27 DIAGNOSIS — Z78.0 MENOPAUSE: ICD-10-CM

## 2025-05-27 DIAGNOSIS — M80.00XD AGE-RELATED OSTEOPOROSIS WITH CURRENT PATHOLOGICAL FRACTURE WITH ROUTINE HEALING: Chronic | ICD-10-CM

## 2025-05-27 DIAGNOSIS — Z78.9 VEGETARIAN DIET: ICD-10-CM

## 2025-05-27 PROCEDURE — 77080 DXA BONE DENSITY AXIAL: CPT | Mod: TC

## 2025-05-27 PROCEDURE — 77080 DXA BONE DENSITY AXIAL: CPT | Mod: 26,,, | Performed by: RADIOLOGY

## 2025-05-27 RX ORDER — TRETINOIN 1 MG/G
CREAM TOPICAL NIGHTLY
Qty: 45 G | Refills: 0 | Status: SHIPPED | OUTPATIENT
Start: 2025-05-27

## 2025-05-28 ENCOUNTER — TELEPHONE (OUTPATIENT)
Dept: INFUSION THERAPY | Facility: HOSPITAL | Age: 70
End: 2025-05-28
Payer: MEDICARE

## 2025-06-04 ENCOUNTER — CLINICAL SUPPORT (OUTPATIENT)
Dept: OBSTETRICS AND GYNECOLOGY | Facility: CLINIC | Age: 70
End: 2025-06-04
Payer: MEDICARE

## 2025-06-04 DIAGNOSIS — N95.1 MENOPAUSAL SYMPTOMS: Primary | ICD-10-CM

## 2025-06-08 NOTE — PROGRESS NOTES
Personal Information    Full Name: Marisa Lin 1955    PCP- Dr. Alexsandra Naylor     Medical History    Do you have a chronic medical condition? (e.g., diabetes, hypertension, thyroid issues)   If yes, please specify:   Yes - Osteoporosis - Prolia injection - Thyroid Bx- Benign     2.   Do you have a history of hormone- related conditions? (e.g., endometriosis, breast cancer, or PCOS)   If yes, please explain:   No    3.   Have you previously or are you currently taking hormone replacement therapy?   If yes, please list: Pellets- estrogen & testosterone - last pellet insertion 2021   Yes - Previous use- Cmpd Estrogen,Testosterone & progesterone cream (1995)   Current Use Estradial vaginal cream 3xs a week     Menstrual History    At what age did you begin menstruating?   13    2.   Have you experienced any changes in your menstrual cycle in the last year?   If yes, please describe:   No    3.   When was your last menstrual period or age of last period?   43    4.   Have you had a hysterectomy or ablation?   No    5.   Do you still have your ovaries?   If yes, month or year:  No    Symptoms Assesments      Are you experiencing any of the following symptoms:  Hot Flashes: Yes   Night Sweats: Yes   Vaginal Dryness or Pain/ Discomfort with [place]: Yes   Mood Swings: Yes   Anxiety or Depression: No   Sleep Disturbances: Yes   Fatigue: Yes   Weight Gain: Yes   Joint or Muscle Pain: Yes   Memory Issues, Brain Fog or Loss of Focus: Yes ADD- Adderal 30 mg 1/2 in the am & 1/2 in the pm    Decreased Interest in Sex (Low Libido): Not sexually active      Lifestyle Factors    How would you describe your diet?  Balanced    2.   How often do you exercise?   Regularly    3.   Do you smoke or consume alcohol?   If yes, please specify frequency:   No    4.   How would you rate your stress levels?   Low    Family History    Is there a family history of menopause-related conditions? (e.g., osteoporosis, breast cancer)  If  yes, please specify  Yes - Mother Osteoporosis, Maternal GM & GrGm osteoporosis . Maternal GrGM Colon cancer     2.   Is there a family history of heart disease?   If yes, please specify   Yes - Paternal Uncle and GF heart attacks     3.   Have you had a colonoscopy?  If yes, month or year:  Yes - 10/2024    4.   Have you had a mammogram?  If yes, month or year:  Yes - 5/2024    5.   Have you had an Annual/ Pap?   If yes, month or year:   Yes - 2024    6.   BMD (If patient is over 50)    Yes 5/2025    --------------------------------    Spoke with patient for a total of 30 minutes during virtual visit.  Patient was guided through expectations and treatment options.     Questions answered. Encouraged to send message or call office with any questions/concerns. Verbalized understanding.       Mirna

## 2025-06-16 ENCOUNTER — TELEPHONE (OUTPATIENT)
Dept: ORTHOPEDICS | Facility: CLINIC | Age: 70
End: 2025-06-16
Payer: MEDICARE

## 2025-06-16 ENCOUNTER — OFFICE VISIT (OUTPATIENT)
Dept: NEUROSURGERY | Facility: CLINIC | Age: 70
End: 2025-06-16
Payer: MEDICARE

## 2025-06-16 ENCOUNTER — HOSPITAL ENCOUNTER (OUTPATIENT)
Dept: RADIOLOGY | Facility: HOSPITAL | Age: 70
Discharge: HOME | End: 2025-06-16
Attending: NURSE PRACTITIONER
Payer: MEDICARE

## 2025-06-16 VITALS
HEART RATE: 91 BPM | WEIGHT: 121.06 LBS | SYSTOLIC BLOOD PRESSURE: 131 MMHG | DIASTOLIC BLOOD PRESSURE: 89 MMHG | BODY MASS INDEX: 22.14 KG/M2 | TEMPERATURE: 99 F

## 2025-06-16 DIAGNOSIS — M54.2 NECK PAIN: ICD-10-CM

## 2025-06-16 DIAGNOSIS — S22.070A COMPRESSION FRACTURE OF T10 VERTEBRA, INITIAL ENCOUNTER: ICD-10-CM

## 2025-06-16 DIAGNOSIS — M40.14 OTHER SECONDARY KYPHOSIS, THORACIC REGION: ICD-10-CM

## 2025-06-16 DIAGNOSIS — M41.9 SCOLIOSIS, UNSPECIFIED SCOLIOSIS TYPE, UNSPECIFIED SPINAL REGION: ICD-10-CM

## 2025-06-16 DIAGNOSIS — M41.9 SCOLIOSIS, UNSPECIFIED SCOLIOSIS TYPE, UNSPECIFIED SPINAL REGION: Primary | ICD-10-CM

## 2025-06-16 DIAGNOSIS — R10.2 PELVIC PAIN: ICD-10-CM

## 2025-06-16 DIAGNOSIS — R15.9 INCONTINENCE OF FECES, UNSPECIFIED FECAL INCONTINENCE TYPE: ICD-10-CM

## 2025-06-16 DIAGNOSIS — R32 URINARY INCONTINENCE, UNSPECIFIED TYPE: ICD-10-CM

## 2025-06-16 PROCEDURE — 3075F SYST BP GE 130 - 139MM HG: CPT | Mod: CPTII,S$GLB,, | Performed by: NURSE PRACTITIONER

## 2025-06-16 PROCEDURE — 3079F DIAST BP 80-89 MM HG: CPT | Mod: CPTII,S$GLB,, | Performed by: NURSE PRACTITIONER

## 2025-06-16 PROCEDURE — 72082 X-RAY EXAM ENTIRE SPI 2/3 VW: CPT | Mod: 26,,, | Performed by: RADIOLOGY

## 2025-06-16 PROCEDURE — 1160F RVW MEDS BY RX/DR IN RCRD: CPT | Mod: CPTII,S$GLB,, | Performed by: NURSE PRACTITIONER

## 2025-06-16 PROCEDURE — 72082 X-RAY EXAM ENTIRE SPI 2/3 VW: CPT | Mod: TC

## 2025-06-16 PROCEDURE — 72114 X-RAY EXAM L-S SPINE BENDING: CPT | Mod: TC

## 2025-06-16 PROCEDURE — 99999 PR PBB SHADOW E&M-EST. PATIENT-LVL IV: CPT | Mod: PBBFAC,,, | Performed by: NURSE PRACTITIONER

## 2025-06-16 PROCEDURE — 1101F PT FALLS ASSESS-DOCD LE1/YR: CPT | Mod: CPTII,S$GLB,, | Performed by: NURSE PRACTITIONER

## 2025-06-16 PROCEDURE — 72114 X-RAY EXAM L-S SPINE BENDING: CPT | Mod: 26,,, | Performed by: RADIOLOGY

## 2025-06-16 PROCEDURE — 3008F BODY MASS INDEX DOCD: CPT | Mod: CPTII,S$GLB,, | Performed by: NURSE PRACTITIONER

## 2025-06-16 PROCEDURE — 1159F MED LIST DOCD IN RCRD: CPT | Mod: CPTII,S$GLB,, | Performed by: NURSE PRACTITIONER

## 2025-06-16 PROCEDURE — 1126F AMNT PAIN NOTED NONE PRSNT: CPT | Mod: CPTII,S$GLB,, | Performed by: NURSE PRACTITIONER

## 2025-06-16 PROCEDURE — 99214 OFFICE O/P EST MOD 30 MIN: CPT | Mod: S$GLB,,, | Performed by: NURSE PRACTITIONER

## 2025-06-16 PROCEDURE — 3288F FALL RISK ASSESSMENT DOCD: CPT | Mod: CPTII,S$GLB,, | Performed by: NURSE PRACTITIONER

## 2025-06-16 NOTE — PROGRESS NOTES
Neurosurgery  History & Physical    SUBJECTIVE:     History of Present Illness: Marisa Lin is a 69 y.o. female with hx of T10 osteoporosis compression fracture that she was diagnosed about 1 year ago. She was previously on Prolia and has recently restarted. She is scheduled to see endocrinology to discuss her DEXA and treatment options. The patient is an avid runner and since the compression fracture she has noticed worsening thoracic kyphosis, increased low back pain, and bowel/bladder incontinence with running. She was evaluated by urogyn and informed that hormones could also be playing a factor in her incontinence; additional testing is being ordered.     She describes the pain as aching across the low back. Aggravating factors include running. Alleviating factors include rest and stretching. Denies numbness or tingling, saddle anesthesia, or gait instability. Reports balance difficulties and leg heaviness. She remains active with her HEP.        Review of patient's allergies indicates:  No Known Allergies    Current Medications[1]    Past Medical History:   Diagnosis Date    Abnormal Pap smear of cervix     ADD (attention deficit disorder)     Compression fracture of T10 vertebra     Kyphosis     Nontoxic single thyroid nodule     Osteoporosis      Past Surgical History:   Procedure Laterality Date    BIOPSY OF THYROID      CERVICAL BIOPSY      CIS    COLONOSCOPY      2015    COLONOSCOPY N/A 10/10/2024    Procedure: COLONOSCOPY;  Surgeon: Buster Sanderson MD;  Location: [facility] ENDOSCOPY;  Service: Endoscopy;  Laterality: N/A;  7/16/24-Ref Dr. Naylor, PEG, instr portal-DS  7/19/24-Pt confirmed new arrival time of 1200pm, updated instruction via portal, pt requests AM appt if possible-DS  10/2 pt aware of new arrival time of 10/15 AM-precall complete-st    FACIAL COSMETIC SURGERY      2017     Family History       Problem Relation (Age of Onset)    Heart disease Father    Kidney disease Mother    Osteoporosis  Maternal Grandmother    Suicide Mother          Social History     Socioeconomic History    Marital status:    Tobacco Use    Smoking status: Never    Smokeless tobacco: Never    Tobacco comments:     1983-86   Substance and Sexual Activity    Alcohol use: Not Currently    Drug use: Never    Sexual activity: Not Currently   Social History Narrative    Lives alone. Son lives in the same apartment building. Other son and grandson lives close. Comes to [facility] once per month. A son's business is in [facility]. Siblings live here as well.      Social Drivers of Health     Financial Resource Strain: Low Risk  (5/23/2024)    Overall Financial Resource Strain (CARDIA)     Difficulty of Paying Living Expenses: Not hard at all   Food Insecurity: No Food Insecurity (5/23/2024)    Hunger Vital Sign     Worried About Running Out of Food in the Last Year: Never true     Ran Out of Food in the Last Year: Never true   Transportation Needs: No Transportation Needs (5/13/2024)    PRAPARE - Transportation     Lack of Transportation (Medical): No     Lack of Transportation (Non-Medical): No   Physical Activity: Sufficiently Active (5/23/2024)    Exercise Vital Sign     Days of Exercise per Week: 5 days     Minutes of Exercise per Session: 40 min   Stress: No Stress Concern Present (5/23/2024)    [place] [employer] - Occupational Stress Questionnaire     Feeling of Stress : Only a little   Housing Stability: Unknown (5/23/2024)    Housing Stability Vital Sign     Unable to Pay for Housing in the Last Year: No       Review of Systems    OBJECTIVE:     Vital Signs  Temp: 98.6 °F (37 °C)  Pulse: 91  BP: 131/89  Pain Score: 0-No pain  Weight: 54.9 kg (121 lb 0.5 oz)  Body mass index is 22.14 kg/m².      Neurosurgery Physical Exam  General: well developed, well nourished, no distress.   Head: normocephalic, atraumatic  Neurologic: Alert and oriented. Thought content appropriate.  GCS: Motor: 6/Verbal: 5/Eyes: 4 GCS  Total: 15  Mental Status: Awake, Alert, Oriented x 4  Language: No aphasia  Speech: No dysarthria  Cranial nerves: face symmetric, tongue midline, CN II-XII grossly intact.   Eyes: pupils equal, round, reactive to light with accomodation, EOMI.   Pulmonary: normal respirations, no signs of respiratory distress  Abdomen: soft, non-distended  Skin: Skin is warm, dry and intact.  Sensory: intact to light touch throughout  Motor Strength:Moves all extremities spontaneously with good tone.   Strength  Deltoids Triceps Biceps Wrist Extension Wrist Flexion Hand    Upper: R 5/5 5/5 5/5 5/5 5/5 5/5    L 5/5 5/5 5/5 5/5 5/5 5/5     HF KE KF DF PF EHL   Lower: R 5/5 5/5 5/5 5/5 5/5 5/5    L 5/5 5/5 5/5 5/5 5/5 5/5     Reflexes:   DTR: 2+ symmetrically throughout.  Callaway's: Negative.     Cerebellar:   Gait stable, fluid.   Tandem Gait: Slight loss of balance  Able to walk on heels & toes     Cervical:   ROM: Full with flexion, extension, lateral rotation and ear-to-shoulder bend.   Midline TTP: Negative.     Thoracic:  Midline TTP: Negative.  Spinal Balance: Significant for thoracic kyphosis     Lumbar:  Midline TTP: Negative.  Straight Leg Test: Negative.    Diagnostic Results:  There is no new imaging to review for this encounter.      ASSESSMENT/PLAN:     Marisa Lin is a 69 y.o. female with hx of T10 osteoporosis compression fracture that she was diagnosed about 1 year ago. The patient is an avid runner and since the compression fracture she has noticed worsening thoracic kyphosis, increased low back pain, and bowel/bladder incontinence with running. I have ordered dynamic xrays and a MRI of the entire spine given her complaints and exam findings. She was encouraged to keep her appointment with endocrinology regarding her osteopenia. The patient to follow-up in clinic with Dr. Linares as previously scheduled. I have encouraged her to contact the clinic with any questions, concerns, or adverse clinical changes. She  verbalized understanding.        ALYSSA Snyder  Neurosurgery  [facility].        Note dictated with voice recognition software, please excuse any grammatical errors.           [1]   Current Outpatient Medications   Medication Sig Dispense Refill    ADDERALL 30 mg Tab Take 30 mg by mouth once daily. 1/2 in the am, 1/2 in the pm daily.      denosumab (PROLIA SUBQ) Inject into the skin.      estradioL (ESTRACE) 0.01 % (0.1 mg/gram) vaginal cream Place 1 g vaginally once daily. Daily for two week then Monday, Wednesday & Friday at night 42.5 g 2    minoxidiL (LONITEN) 2.5 MG tablet Take 0.625 mg by mouth once daily.      RETIN-A 0.1 % cream Apply topically every evening. 45 g 0     No current facility-administered medications for this visit.

## 2025-06-16 NOTE — TELEPHONE ENCOUNTER
Spoke with patient. Informed her that we do not treat back pain in this clinic.  Gave her number to back and spine. GENESIS.

## 2025-06-17 ENCOUNTER — PATIENT MESSAGE (OUTPATIENT)
Dept: PRIMARY CARE CLINIC | Facility: CLINIC | Age: 70
End: 2025-06-17

## 2025-06-17 ENCOUNTER — PATIENT MESSAGE (OUTPATIENT)
Dept: ORTHOPEDICS | Facility: CLINIC | Age: 70
End: 2025-06-17

## 2025-06-17 ENCOUNTER — HOSPITAL ENCOUNTER (OUTPATIENT)
Dept: RADIOLOGY | Facility: HOSPITAL | Age: 70
Discharge: HOME | End: 2025-06-17
Attending: NURSE PRACTITIONER
Payer: MEDICARE

## 2025-06-17 ENCOUNTER — TELEPHONE (OUTPATIENT)
Dept: PRIMARY CARE CLINIC | Facility: CLINIC | Age: 70
End: 2025-06-17
Payer: MEDICARE

## 2025-06-17 DIAGNOSIS — R10.2 PELVIC PAIN: Primary | ICD-10-CM

## 2025-06-17 DIAGNOSIS — R10.2 PELVIC PAIN: ICD-10-CM

## 2025-06-17 PROCEDURE — 72170 X-RAY EXAM OF PELVIS: CPT | Mod: 26,,, | Performed by: RADIOLOGY

## 2025-06-17 PROCEDURE — 72170 X-RAY EXAM OF PELVIS: CPT | Mod: TC

## 2025-06-17 NOTE — TELEPHONE ENCOUNTER
Copied from CRM #[id number]. Topic: General Inquiry - Patient Advice  >> Jun 17, 2025  3:33 PM Le wrote:  .1MEDICALADVICE     Patient is calling for Medical Advice regarding:Good Afternoon, Patient could not join visit. It not let her in. Patient want Dr to know and call her back. Patient state hardware was working but Join button would not work. Patient need visit to get Rx.     How long has patient had these symptoms:    Pharmacy name and phone#:  [id number] - [place] - [address] AT [facility] & [address]  [place] [address]  Phone: [phone] Fax: [phone]        Patient wants a call back or thru [facility], provide patient's call back phone number  @ Patient     Comments:    Please advise patient replies from provider may take up to 48 hours.

## 2025-06-18 ENCOUNTER — LAB VISIT (OUTPATIENT)
Dept: LAB | Facility: HOSPITAL | Age: 70
End: 2025-06-18
Attending: NURSE PRACTITIONER
Payer: MEDICARE

## 2025-06-18 ENCOUNTER — OFFICE VISIT (OUTPATIENT)
Dept: OBSTETRICS AND GYNECOLOGY | Facility: CLINIC | Age: 70
End: 2025-06-18
Payer: MEDICARE

## 2025-06-18 VITALS
HEIGHT: 63 IN | DIASTOLIC BLOOD PRESSURE: 78 MMHG | WEIGHT: 122.56 LBS | SYSTOLIC BLOOD PRESSURE: 118 MMHG | BODY MASS INDEX: 21.71 KG/M2

## 2025-06-18 DIAGNOSIS — M81.8 OTHER OSTEOPOROSIS WITHOUT CURRENT PATHOLOGICAL FRACTURE: ICD-10-CM

## 2025-06-18 DIAGNOSIS — R53.83 FATIGUE, UNSPECIFIED TYPE: ICD-10-CM

## 2025-06-18 DIAGNOSIS — N95.1 MENOPAUSAL SYMPTOMS: ICD-10-CM

## 2025-06-18 DIAGNOSIS — N95.1 MENOPAUSAL SYMPTOMS: Primary | ICD-10-CM

## 2025-06-18 DIAGNOSIS — Z79.890 HORMONE REPLACEMENT THERAPY (HRT): ICD-10-CM

## 2025-06-18 DIAGNOSIS — G47.00 INSOMNIA, UNSPECIFIED TYPE: ICD-10-CM

## 2025-06-18 DIAGNOSIS — R45.86 MOOD CHANGES: ICD-10-CM

## 2025-06-18 DIAGNOSIS — M81.0 AGE-RELATED OSTEOPOROSIS WITHOUT CURRENT PATHOLOGICAL FRACTURE: ICD-10-CM

## 2025-06-18 DIAGNOSIS — E04.1 THYROID NODULE: ICD-10-CM

## 2025-06-18 DIAGNOSIS — R79.89 LOW TESTOSTERONE LEVEL IN FEMALE: ICD-10-CM

## 2025-06-18 LAB
25(OH)D3+25(OH)D2 SERPL-MCNC: 59 NG/ML (ref 30–96)
ESTRADIOL SERPL HS-MCNC: <10 PG/ML
T3FREE SERPL-MCNC: 78 NG/DL (ref 60–180)
T4 FREE SERPL-MCNC: 1.23 NG/DL (ref 0.71–1.51)
TESTOST SERPL-MCNC: 16 NG/DL (ref 5–73)
TSH SERPL-ACNC: 2.37 UIU/ML (ref 0.4–4)

## 2025-06-18 PROCEDURE — 3288F FALL RISK ASSESSMENT DOCD: CPT | Mod: CPTII,S$GLB,, | Performed by: NURSE PRACTITIONER

## 2025-06-18 PROCEDURE — 84480 ASSAY TRIIODOTHYRONINE (T3): CPT

## 2025-06-18 PROCEDURE — 1159F MED LIST DOCD IN RCRD: CPT | Mod: CPTII,S$GLB,, | Performed by: NURSE PRACTITIONER

## 2025-06-18 PROCEDURE — 3008F BODY MASS INDEX DOCD: CPT | Mod: CPTII,S$GLB,, | Performed by: NURSE PRACTITIONER

## 2025-06-18 PROCEDURE — 84439 ASSAY OF FREE THYROXINE: CPT

## 2025-06-18 PROCEDURE — 84270 ASSAY OF SEX HORMONE GLOBUL: CPT

## 2025-06-18 PROCEDURE — 1101F PT FALLS ASSESS-DOCD LE1/YR: CPT | Mod: CPTII,S$GLB,, | Performed by: NURSE PRACTITIONER

## 2025-06-18 PROCEDURE — 3074F SYST BP LT 130 MM HG: CPT | Mod: CPTII,S$GLB,, | Performed by: NURSE PRACTITIONER

## 2025-06-18 PROCEDURE — 3078F DIAST BP <80 MM HG: CPT | Mod: CPTII,S$GLB,, | Performed by: NURSE PRACTITIONER

## 2025-06-18 PROCEDURE — 82306 VITAMIN D 25 HYDROXY: CPT

## 2025-06-18 PROCEDURE — 1126F AMNT PAIN NOTED NONE PRSNT: CPT | Mod: CPTII,S$GLB,, | Performed by: NURSE PRACTITIONER

## 2025-06-18 PROCEDURE — 82670 ASSAY OF TOTAL ESTRADIOL: CPT

## 2025-06-18 PROCEDURE — 99999 PR PBB SHADOW E&M-EST. PATIENT-LVL III: CPT | Mod: PBBFAC,,, | Performed by: NURSE PRACTITIONER

## 2025-06-18 PROCEDURE — 84403 ASSAY OF TOTAL TESTOSTERONE: CPT

## 2025-06-18 PROCEDURE — 84443 ASSAY THYROID STIM HORMONE: CPT

## 2025-06-18 PROCEDURE — 36415 COLL VENOUS BLD VENIPUNCTURE: CPT | Mod: PN

## 2025-06-18 PROCEDURE — 1160F RVW MEDS BY RX/DR IN RCRD: CPT | Mod: CPTII,S$GLB,, | Performed by: NURSE PRACTITIONER

## 2025-06-18 PROCEDURE — 99214 OFFICE O/P EST MOD 30 MIN: CPT | Mod: S$GLB,,, | Performed by: NURSE PRACTITIONER

## 2025-06-18 RX ORDER — TESTOSTERONE 20.25 MG/1.25G
1-2 GEL TOPICAL DAILY
Qty: 75 G | Refills: 3 | Status: SHIPPED | OUTPATIENT
Start: 2025-06-18 | End: 2025-07-18

## 2025-06-18 RX ORDER — PROGESTERONE 100 MG/1
100 CAPSULE ORAL NIGHTLY
Qty: 30 CAPSULE | Refills: 11 | Status: SHIPPED | OUTPATIENT
Start: 2025-06-18 | End: 2026-06-18

## 2025-06-18 RX ORDER — ESTRADIOL 0.04 MG/D
1 FILM, EXTENDED RELEASE TRANSDERMAL
Qty: 8 PATCH | Refills: 11 | Status: SHIPPED | OUTPATIENT
Start: 2025-06-19 | End: 2026-06-19

## 2025-06-18 NOTE — PROGRESS NOTES
HISTORY OF PRESENT ILLNESS:    Marisa Lin is a 69 y.o. female, , No LMP recorded. Patient is postmenopausal.,  presents with c/o menopausal symptoms: osteoporosis (Prolia injections), insomnia, fatigue, mood changes, hot flashes/night sweats. Hx of thyroid nodules (2). Pt desires to restart on HRT. Pt is currently using estrace vaginal cream for vaginal atrophy. Pt was previously on estrogen tablets with micronized progesterone and testosterone pellets; discontinued in . Pt has an appointment with Dr. Ramirez on 2025 for hormone replacement.    Past Medical History:   Diagnosis Date    Abnormal Pap smear of cervix     ADD (attention deficit disorder)     Compression fracture of T10 vertebra     Kyphosis     Nontoxic single thyroid nodule     Osteoporosis      Past Surgical History:   Procedure Laterality Date    BIOPSY OF THYROID      CERVICAL BIOPSY      CIS    COLONOSCOPY          COLONOSCOPY N/A 10/10/2024    Procedure: COLONOSCOPY;  Surgeon: Buster Sanderson MD;  Location: [facility] ENDOSCOPY;  Service: Endoscopy;  Laterality: N/A;  24-Ref Dr. Naylor, PEG, instr portal-DS  24-Pt confirmed new arrival time of 1200pm, updated instruction via portal, pt requests AM appt if possible-DS  10/2 pt aware of new arrival time of 10/15 AM-precall complete-st    FACIAL COSMETIC SURGERY           MEDICATIONS AND ALLERGIES:    Current Medications[1]    Review of patient's allergies indicates:  No Known Allergies    Family History   Problem Relation Name Age of Onset    Kidney disease Mother      Suicide Mother      Heart disease Father      Osteoporosis Maternal Grandmother       Social History[2]    ROS:  GENERAL: No weight changes. No swelling. Reports HF/NS, insomnia, fatigue, mood changes. No fever.  BREASTS: No pain. No lumps. No discharge.  ABDOMEN: No pain. No nausea. No vomiting. No diarrhea. No constipation.  REPRODUCTIVE: No abnormal bleeding.   VULVA: No pain. No lesions. No  "itching.  VAGINA: No relaxation. No itching. No odor. No discharge. No lesions.  URINARY: No incontinence. No nocturia. No frequency. No dysuria.    /78   Ht 5' 3" (1.6 m)   Wt 55.6 kg (122 lb 9.2 oz)   BMI 21.71 kg/m²     PE:  APPEARANCE: Well nourished, well developed, in no acute distress.  AFFECT: WNL, alert and oriented x 3.  SKIN: Warm and dry  EXTREMITIES: No edema    DIAGNOSIS:  1. Menopausal symptoms    2. Mood changes    3. Insomnia, unspecified type    4. Age-related osteoporosis without current pathological fracture    5. Hormone replacement therapy (HRT)    6. Fatigue, unspecified type    7. Thyroid nodule    8. Other osteoporosis without current pathological fracture      PLAN:  -Pt desires to restart on HRT- sent viville dot patches and prometrium to pharmacy; discussed benefits, risks, side effects, proper use/application (may apply to abdomen, changes twice weekly)  -Pt also desires to start testosterone for fatigue/brain fog and muscle mass; sent androgel to pharmacy- discussed benefits; risks, side effects, proper use (may apply to shoulders, arms or inner thigh daily; advised pt to wash hands after application  -Blood labs to be drawn today: estradiol, total testosterone, vitamin D, TSH, T3, T4    Orders Placed This Encounter    TSH    T4, Free    Vitamin D    T3    Estradiol    Testosterone,Total    Sex Hormone Binding Globulin    Testosterone, Free    estradioL (VIVELLE-DOT) 0.0375 mg/24 hr    progesterone (PROMETRIUM) 100 MG capsule    testosterone (ANDROGEL) 20.25 mg/1.25 gram (1.62 %) GlPm     COUNSELING:  Discussed proper usage of HRT meds prescibed    FOLLOW-UP with me as needed or if symptoms persist or worsen    Time spent over 30 minutes   This includes face to face time and non-face to face time preparing to see the patient (eg, review of tests), obtaining and/or reviewing separately obtained history, documenting clinical information in the electronic or other health record, " independently interpreting results and communicating results to the patient/family/caregiver, or care coordinator.     Kaiden Panchal, DNP, RN, APRN, WHNP-BC [facility]          [1]   Current Outpatient Medications:     ADDERALL 30 mg Tab, Take 30 mg by mouth once daily. 1/2 in the am, 1/2 in the pm daily., Disp: , Rfl:     denosumab (PROLIA SUBQ), Inject into the skin., Disp: , Rfl:     estradioL (ESTRACE) 0.01 % (0.1 mg/gram) vaginal cream, Place 1 g vaginally once daily. Daily for two week then Monday, Wednesday & Friday at night, Disp: 42.5 g, Rfl: 2    estradioL (VIVELLE-DOT) 0.0375 mg/24 hr, Place 1 patch onto the skin twice a week., Disp: 8 patch, Rfl: 11    minoxidiL (LONITEN) 2.5 MG tablet, Take 0.625 mg by mouth once daily., Disp: , Rfl:     progesterone (PROMETRIUM) 100 MG capsule, Take 1 capsule (100 mg total) by mouth nightly., Disp: 30 capsule, Rfl: 11    RETIN-A 0.1 % cream, Apply topically every evening., Disp: 45 g, Rfl: 0    testosterone (ANDROGEL) 20.25 mg/1.25 gram (1.62 %) GlPm, Place 1-2 Pump onto the skin once daily., Disp: 75 g, Rfl: 3  [2]   Social History  Socioeconomic History    Marital status:    Tobacco Use    Smoking status: Never    Smokeless tobacco: Never    Tobacco comments:     1983-86   Substance and Sexual Activity    Alcohol use: Not Currently    Drug use: Never    Sexual activity: Not Currently   Social History Narrative    Lives alone. Son lives in the same apartment building. Other son and grandson lives close. Comes to [facility] once per month. A son's business is in [place]. Siblings live here as well.      Social Drivers of Health     Financial Resource Strain: Low Risk  (6/17/2025)    Overall Financial Resource Strain (CARDIA)     Difficulty of Paying Living Expenses: Not very hard   Food Insecurity: No Food Insecurity (6/17/2025)    Hunger Vital Sign     Worried About Running Out of Food in the Last Year: Never true     Ran Out of Food  in the Last Year: Never true   Transportation Needs: No Transportation Needs (6/17/2025)    PRAPARE - Transportation     Lack of Transportation (Medical): No     Lack of Transportation (Non-Medical): No   Physical Activity: Sufficiently Active (6/17/2025)    Exercise Vital Sign     Days of Exercise per Week: 4 days     Minutes of Exercise per Session: 40 min   Stress: Stress Concern Present (6/17/2025)    [employer] [place] [employer] - Occupational Stress Questionnaire     Feeling of Stress : Very much   Housing Stability: Low Risk  (6/17/2025)    Housing Stability Vital Sign     Unable to Pay for Housing in the Last Year: No     Number of Times Moved in the Last Year: 0     Homeless in the Last Year: No

## 2025-06-19 ENCOUNTER — LAB VISIT (OUTPATIENT)
Dept: LAB | Facility: HOSPITAL | Age: 70
End: 2025-06-19
Payer: MEDICARE

## 2025-06-19 ENCOUNTER — TELEPHONE (OUTPATIENT)
Dept: NEUROSURGERY | Facility: CLINIC | Age: 70
End: 2025-06-19
Payer: MEDICARE

## 2025-06-19 ENCOUNTER — PATIENT MESSAGE (OUTPATIENT)
Dept: OBSTETRICS AND GYNECOLOGY | Facility: CLINIC | Age: 70
End: 2025-06-19
Payer: MEDICARE

## 2025-06-19 ENCOUNTER — INFUSION (OUTPATIENT)
Dept: INFUSION THERAPY | Facility: HOSPITAL | Age: 70
End: 2025-06-19
Attending: STUDENT IN AN ORGANIZED HEALTH CARE EDUCATION/TRAINING PROGRAM
Payer: MEDICARE

## 2025-06-19 DIAGNOSIS — G47.00 INSOMNIA, UNSPECIFIED TYPE: ICD-10-CM

## 2025-06-19 DIAGNOSIS — Z79.890 HORMONE REPLACEMENT THERAPY (HRT): ICD-10-CM

## 2025-06-19 DIAGNOSIS — N95.1 MENOPAUSAL SYMPTOMS: ICD-10-CM

## 2025-06-19 DIAGNOSIS — M80.00XD AGE-RELATED OSTEOPOROSIS WITH CURRENT PATHOLOGICAL FRACTURE WITH ROUTINE HEALING: ICD-10-CM

## 2025-06-19 DIAGNOSIS — Z78.0 MENOPAUSE: Primary | ICD-10-CM

## 2025-06-19 DIAGNOSIS — M40.209 KYPHOSIS, UNSPECIFIED KYPHOSIS TYPE, UNSPECIFIED SPINAL REGION: ICD-10-CM

## 2025-06-19 DIAGNOSIS — S22.070D COMPRESSION FRACTURE OF T10 VERTEBRA WITH ROUTINE HEALING, SUBSEQUENT ENCOUNTER: ICD-10-CM

## 2025-06-19 DIAGNOSIS — R53.83 FATIGUE, UNSPECIFIED TYPE: ICD-10-CM

## 2025-06-19 PROCEDURE — 63600175 PHARM REV CODE 636 W HCPCS: Mod: JZ,TB | Performed by: STUDENT IN AN ORGANIZED HEALTH CARE EDUCATION/TRAINING PROGRAM

## 2025-06-19 PROCEDURE — 84270 ASSAY OF SEX HORMONE GLOBUL: CPT

## 2025-06-19 PROCEDURE — 84402 ASSAY OF FREE TESTOSTERONE: CPT

## 2025-06-19 PROCEDURE — 36415 COLL VENOUS BLD VENIPUNCTURE: CPT

## 2025-06-19 PROCEDURE — 96372 THER/PROPH/DIAG INJ SC/IM: CPT

## 2025-06-19 RX ADMIN — DENOSUMAB 60 MG: 60 INJECTION SUBCUTANEOUS at 11:06

## 2025-06-19 NOTE — TELEPHONE ENCOUNTER
Called pt and scheduled for 8/18                 ----- Message from Gallo Bobby sent at 6/19/2025 11:33 AM CDT -----  Regarding: appointment  Tyrese,      Can you please assist in getting this pt scheduled for an appointment?    Thanks

## 2025-06-19 NOTE — PLAN OF CARE
1130 - Pt tolerated Prolia injection well today, no complaints or complications. SQ injection given in left arm without issue. Pt aware to call provider with any questions or concerns and is aware of upcoming appts. Pt ambulatory from clinic with steady gait, no distress noted.

## 2025-06-21 ENCOUNTER — HOSPITAL ENCOUNTER (OUTPATIENT)
Dept: RADIOLOGY | Facility: OTHER | Age: 70
Discharge: HOME | End: 2025-06-21
Attending: NURSE PRACTITIONER
Payer: MEDICARE

## 2025-06-21 DIAGNOSIS — S22.070A COMPRESSION FRACTURE OF T10 VERTEBRA, INITIAL ENCOUNTER: ICD-10-CM

## 2025-06-21 DIAGNOSIS — M41.9 SCOLIOSIS, UNSPECIFIED SCOLIOSIS TYPE, UNSPECIFIED SPINAL REGION: ICD-10-CM

## 2025-06-21 PROCEDURE — 72148 MRI LUMBAR SPINE W/O DYE: CPT | Mod: 26,,, | Performed by: RADIOLOGY

## 2025-06-21 PROCEDURE — 72146 MRI CHEST SPINE W/O DYE: CPT | Mod: 26,,, | Performed by: RADIOLOGY

## 2025-06-21 PROCEDURE — 72141 MRI NECK SPINE W/O DYE: CPT | Mod: 26,,, | Performed by: RADIOLOGY

## 2025-06-21 PROCEDURE — 72146 MRI CHEST SPINE W/O DYE: CPT | Mod: TC

## 2025-06-23 ENCOUNTER — RESULTS FOLLOW-UP (OUTPATIENT)
Dept: OBSTETRICS AND GYNECOLOGY | Facility: CLINIC | Age: 70
End: 2025-06-23

## 2025-06-23 LAB
W FREE TESTOSTERONE: <0.4 PG/ML
W SEX HORMONE BINDING GLOBULIN: 119 NMOL/L
W SEX HORMONE BINDING GLOBULIN: 123 NMOL/L

## 2025-06-25 ENCOUNTER — PATIENT MESSAGE (OUTPATIENT)
Dept: OBSTETRICS AND GYNECOLOGY | Facility: CLINIC | Age: 70
End: 2025-06-25
Payer: MEDICARE

## 2025-06-27 ENCOUNTER — TELEPHONE (OUTPATIENT)
Dept: PRIMARY CARE CLINIC | Facility: CLINIC | Age: 70
End: 2025-06-27

## 2025-06-27 ENCOUNTER — PATIENT MESSAGE (OUTPATIENT)
Dept: PRIMARY CARE CLINIC | Facility: CLINIC | Age: 70
End: 2025-06-27

## 2025-06-27 ENCOUNTER — OFFICE VISIT (OUTPATIENT)
Dept: PRIMARY CARE CLINIC | Facility: CLINIC | Age: 70
End: 2025-06-27
Payer: MEDICARE

## 2025-06-27 DIAGNOSIS — M50.00 CERVICAL DISC DISEASE WITH MYELOPATHY: ICD-10-CM

## 2025-06-27 DIAGNOSIS — Z79.899 OTHER LONG TERM (CURRENT) DRUG THERAPY: ICD-10-CM

## 2025-06-27 DIAGNOSIS — R79.9 ABNORMAL FINDING OF BLOOD CHEMISTRY, UNSPECIFIED: ICD-10-CM

## 2025-06-27 DIAGNOSIS — M81.0 AGE-RELATED OSTEOPOROSIS WITHOUT CURRENT PATHOLOGICAL FRACTURE: ICD-10-CM

## 2025-06-27 DIAGNOSIS — F90.2 ATTENTION DEFICIT HYPERACTIVITY DISORDER (ADHD), COMBINED TYPE: ICD-10-CM

## 2025-06-27 DIAGNOSIS — Z79.890 POST-MENOPAUSE ON HRT (HORMONE REPLACEMENT THERAPY): Primary | ICD-10-CM

## 2025-06-27 DIAGNOSIS — Z78.0 MENOPAUSE: ICD-10-CM

## 2025-06-27 PROBLEM — M85.80 OSTEOPENIA: Status: ACTIVE | Noted: 2025-06-27

## 2025-06-27 NOTE — PROGRESS NOTES
The patient location is: [place]  The chief complaint leading to consultation is: F/u    Visit type: audiovisual        Marisa Lin  1955        Subjective     Chief Complaint:    History of Present Illness:  Ms. Marisa Lin is a 69 y.o. female who presents for virtual visit for f/u.    On HRT. Seeing OBGYN. Also has appt with menopause specialist 8/2025.  Used to do pellets but stopped during Covid.  Has a CT calcium score scheduled.  Wants a high sensitive CRP ordered. Reports she has read some articles about this.    Seeing Neuro spine. Has thoracic compression fractures and cervical disc disease with mod/severe stenosis.  No tingling or numbness in hands. Sometimes numb in toes.   Unsure if having weakness, does feel like her  strength has decreased.  Has f/u appts scheduled.    BP Readings from Last 3 Encounters:   06/18/25 118/78   06/16/25 131/89   10/10/24 100/63      Osteoporosis- on Prolia.  Hx of compression fractures.  Wants another DEXA, had one last year. Will pay out of pocket.    ADHD- on Adderall from outside MD. Reports unable to stop or will affect QOL.    Answers submitted by the patient for this visit:  Review of Systems Questionnaire (Submitted on 6/27/2025)  activity change: No  unexpected weight change: No  rhinorrhea: No  trouble swallowing: No  visual disturbance: No  chest tightness: No  polyuria: No  difficulty urinating: No  menstrual problem: No  joint swelling: No  arthralgias: No  confusion: No  dysphoric mood: No      Review of Systems   Constitutional:  Positive for malaise/fatigue. Negative for chills and fever.   HENT:  Negative for hearing loss.    Eyes:  Negative for discharge.   Respiratory:  Negative for wheezing.    Cardiovascular:  Negative for chest pain and palpitations.   Gastrointestinal:  Negative for blood in stool, constipation, diarrhea and vomiting.   Genitourinary:  Negative for dysuria and hematuria.   Musculoskeletal:  Positive for back pain and joint  pain. Negative for neck pain.   Neurological:  Negative for weakness and headaches.   Endo/Heme/Allergies:  Negative for polydipsia.        PAST HISTORY:     Past Medical History:   Diagnosis Date    Abnormal Pap smear of cervix     ADD (attention deficit disorder)     Compression fracture of T10 vertebra     Kyphosis     Nontoxic single thyroid nodule     Osteoporosis        Past Surgical History:   Procedure Laterality Date    BIOPSY OF THYROID      CERVICAL BIOPSY      CIS    COLONOSCOPY      2015    COLONOSCOPY N/A 10/10/2024    Procedure: COLONOSCOPY;  Surgeon: Buster Sanderson MD;  Location: [facility] ENDOSCOPY;  Service: Endoscopy;  Laterality: N/A;  7/16/24-Ref Dr. Naylor, PEG, instr portal-DS  7/19/24-Pt confirmed new arrival time of 1200pm, updated instruction via portal, pt requests AM appt if possible-DS  10/2 pt aware of new arrival time of 10/15 AM-precall complete-st    FACIAL COSMETIC SURGERY      2017       Family History   Problem Relation Name Age of Onset    Kidney disease Mother      Suicide Mother      Heart disease Father      Osteoporosis Maternal Grandmother           MEDICATIONS & ALLERGIES:     Current Outpatient Medications on File Prior to Visit   Medication Sig    ADDERALL 30 mg Tab Take 30 mg by mouth once daily. 1/2 in the am, 1/2 in the pm daily.    denosumab (PROLIA SUBQ) Inject into the skin.    estradioL (ESTRACE) 0.01 % (0.1 mg/gram) vaginal cream Place 1 g vaginally once daily. Daily for two week then Monday, Wednesday & Friday at night    estradioL (VIVELLE-DOT) 0.0375 mg/24 hr Place 1 patch onto the skin twice a week.    minoxidiL (LONITEN) 2.5 MG tablet Take 0.625 mg by mouth once daily.    progesterone (PROMETRIUM) 100 MG capsule Take 1 capsule (100 mg total) by mouth nightly.    RETIN-A 0.1 % cream Apply topically every evening.    testosterone (ANDROGEL) 20.25 mg/1.25 gram (1.62 %) GlPm Place 1-2 Pump onto the skin once daily.     No current facility-administered medications  "on file prior to visit.       Review of patient's allergies indicates:  No Known Allergies    OBJECTIVE:     There is no height or weight on file to calculate BMI.     Physical Exam:  Physical Exam  Constitutional:       General: She is not in acute distress.     Appearance: Normal appearance. She is not ill-appearing, toxic-appearing or diaphoretic.      Comments: Limited 2/2 Virtual Exam   HENT:      Head: Normocephalic and atraumatic.   Eyes:      Conjunctiva/sclera: Conjunctivae normal.   Pulmonary:      Effort: Pulmonary effort is normal. No respiratory distress.   Neurological:      Mental Status: She is alert and oriented to person, place, and time. Mental status is at baseline.   Psychiatric:         Mood and Affect: Mood normal.         Behavior: Behavior normal.            Laboratory  Lab Results   Component Value Date    WBC 5.14 05/20/2024    HGB 14.8 05/20/2024    HCT 44.5 05/20/2024    MCV 92 05/20/2024     05/20/2024     Lab Results   Component Value Date    GLU 88 05/20/2024     05/20/2024    K 4.5 05/20/2024     05/20/2024    CO2 28 05/20/2024    BUN 20 05/20/2024    CREATININE 1.0 05/20/2024    CALCIUM 9.9 05/20/2024     No results found for: "INR", "PROTIME"  Lab Results   Component Value Date    HGBA1C 5.5 05/20/2024             ASSESSMENT & PLAN:   Ms. Marisa Lin is a 69 y.o. female who was seen today for f/u.       1. Post-menopause on HRT (hormone replacement therapy)  -     Ferritin; Future; Expected date: 07/24/2025  -     Iron and TIBC; Future; Expected date: 07/24/2025  -     Vitamin B12; Future; Expected date: 06/27/2025    2. Menopause  -     Ferritin; Future; Expected date: 07/24/2025  -     Iron and TIBC; Future; Expected date: 07/24/2025  -     Vitamin B12; Future; Expected date: 06/27/2025    3. Cervical disc disease with myelopathy  -     Ferritin; Future; Expected date: 07/24/2025  -     Iron and TIBC; Future; Expected date: 07/24/2025  -     Vitamin B12; " Future; Expected date: 06/27/2025    4. Age-related osteoporosis without current pathological fracture  -     Ferritin; Future; Expected date: 07/24/2025  -     Iron and TIBC; Future; Expected date: 07/24/2025  -     Vitamin B12; Future; Expected date: 06/27/2025  -     Ambulatory referral/consult to Endocrinology; Future; Expected date: 07/04/2025  -     DXA Bone Density Axial Skeleton 1 or more sites; Future; Expected date: 06/27/2025    5. Attention deficit hyperactivity disorder (ADHD), combined type    6. Abnormal finding of blood chemistry, unspecified  -     Ferritin; Future; Expected date: 07/24/2025  -     Iron and TIBC; Future; Expected date: 07/24/2025  -     Vitamin B12; Future; Expected date: 06/27/2025    7. Other long term (current) drug therapy  -     Vitamin B12; Future; Expected date: 06/27/2025         Has in person appt scheduled.    Alexsandra Naylor MD  Internal Medicine          Face to Face time with patient: 20  25 minutes of total time spent on the encounter, which includes face to face time and non-face to face time preparing to see the patient (eg, review of tests), Obtaining and/or reviewing separately obtained history, Documenting clinical information in the electronic or other health record, Independently interpreting results (not separately reported) and communicating results to the patient/family/caregiver, or Care coordination (not separately reported).         Each patient to whom he or she provides medical services by telemedicine is:  (1) informed of the relationship between the physician and patient and the respective role of any other health care provider with respect to management of the patient; and (2) notified that he or she may decline to receive medical services by telemedicine and may withdraw from such care at any time.    Portions of this note may have been generated using voice recognition software.  Please excuse any spelling/grammatical errors. Occasional wrong-word or  sound-a-like substitutions may have also occurred due to the inherent limitations of voice recognition software. Read the chart carefully and recognize, using context, where substitutions have occurred.

## 2025-06-27 NOTE — TELEPHONE ENCOUNTER
Copied from CRM #[id number]. Topic: General Inquiry - Patient Advice  >> Jun 27, 2025  3:10 PM Israel wrote:  .1MEDICALADVICE     Patient is calling for Medical Advice regarding: Patient having technical issues and cannot join her virtual appointment. She wanted to notify the office and see maybe if she can change this to audio. Please call patient at number provided.     How long has patient had these symptoms:    Pharmacy name and phone#:    Patient wants a call back or thru [facility], provide patient's call back phone number: Pt; [phone] (M)      Comments:    Please advise patient replies from provider may take up to 48 hours.

## 2025-07-07 ENCOUNTER — RX ONLY (RX ONLY)
Age: 70
End: 2025-07-07

## 2025-07-07 DIAGNOSIS — R45.86 MOOD CHANGES: ICD-10-CM

## 2025-07-07 DIAGNOSIS — Z79.890 HORMONE REPLACEMENT THERAPY (HRT): ICD-10-CM

## 2025-07-07 DIAGNOSIS — N95.1 MENOPAUSAL SYMPTOMS: ICD-10-CM

## 2025-07-07 DIAGNOSIS — G47.00 INSOMNIA, UNSPECIFIED TYPE: ICD-10-CM

## 2025-07-07 DIAGNOSIS — M81.0 AGE-RELATED OSTEOPOROSIS WITHOUT CURRENT PATHOLOGICAL FRACTURE: ICD-10-CM

## 2025-07-07 RX ORDER — PROGESTERONE 200 MG/1
200 CAPSULE ORAL NIGHTLY
Qty: 30 CAPSULE | Refills: 11 | Status: SHIPPED | OUTPATIENT
Start: 2025-07-07 | End: 2026-07-07

## 2025-07-07 RX ORDER — ESTRADIOL 0.1 MG/D
1 FILM, EXTENDED RELEASE TRANSDERMAL
Qty: 8 PATCH | Refills: 11 | Status: SHIPPED | OUTPATIENT
Start: 2025-07-07 | End: 2026-07-07

## 2025-07-07 NOTE — PROGRESS NOTES
Pt requesting increase in estradiol patches and prometrium; discussed max benefits after 2-3 months and advise lower dose as possible. Sent to pharmacy per pt's request.

## 2025-07-09 ENCOUNTER — HOSPITAL ENCOUNTER (OUTPATIENT)
Dept: RADIOLOGY | Facility: HOSPITAL | Age: 70
Discharge: HOME | End: 2025-07-09
Attending: STUDENT IN AN ORGANIZED HEALTH CARE EDUCATION/TRAINING PROGRAM
Payer: MEDICARE

## 2025-07-09 DIAGNOSIS — Z78.9 VEGETARIAN DIET: ICD-10-CM

## 2025-07-09 DIAGNOSIS — L64.9 ANDROGENIC ALOPECIA: ICD-10-CM

## 2025-07-09 DIAGNOSIS — Z78.0 MENOPAUSE: ICD-10-CM

## 2025-07-09 DIAGNOSIS — Z13.6 ENCOUNTER FOR SCREENING FOR CARDIOVASCULAR DISORDERS: ICD-10-CM

## 2025-07-09 DIAGNOSIS — M80.00XD AGE-RELATED OSTEOPOROSIS WITH CURRENT PATHOLOGICAL FRACTURE WITH ROUTINE HEALING: Chronic | ICD-10-CM

## 2025-07-09 PROCEDURE — 75571 CT HRT W/O DYE W/CA TEST: CPT | Mod: TC

## 2025-07-11 ENCOUNTER — LAB VISIT (OUTPATIENT)
Dept: LAB | Facility: HOSPITAL | Age: 70
End: 2025-07-11
Attending: STUDENT IN AN ORGANIZED HEALTH CARE EDUCATION/TRAINING PROGRAM
Payer: MEDICARE

## 2025-07-11 ENCOUNTER — TELEPHONE (OUTPATIENT)
Dept: PRIMARY CARE CLINIC | Facility: CLINIC | Age: 70
End: 2025-07-11

## 2025-07-11 ENCOUNTER — OFFICE VISIT (OUTPATIENT)
Dept: PRIMARY CARE CLINIC | Facility: CLINIC | Age: 70
End: 2025-07-11
Payer: MEDICARE

## 2025-07-11 VITALS
HEART RATE: 88 BPM | BODY MASS INDEX: 22.15 KG/M2 | HEIGHT: 63 IN | SYSTOLIC BLOOD PRESSURE: 130 MMHG | OXYGEN SATURATION: 98 % | DIASTOLIC BLOOD PRESSURE: 82 MMHG | WEIGHT: 125 LBS

## 2025-07-11 DIAGNOSIS — R01.1 MURMUR: ICD-10-CM

## 2025-07-11 DIAGNOSIS — R59.0 LAD (LYMPHADENOPATHY), CERVICAL: ICD-10-CM

## 2025-07-11 DIAGNOSIS — R94.4 DECREASED CALCULATED GFR: ICD-10-CM

## 2025-07-11 DIAGNOSIS — R53.83 FATIGUE, UNSPECIFIED TYPE: ICD-10-CM

## 2025-07-11 DIAGNOSIS — E61.1 LOW IRON: ICD-10-CM

## 2025-07-11 DIAGNOSIS — Z00.00 ANNUAL PHYSICAL EXAM: Primary | ICD-10-CM

## 2025-07-11 DIAGNOSIS — R06.83 SNORES: ICD-10-CM

## 2025-07-11 LAB
ANION GAP (OHS): 9 MMOL/L (ref 8–16)
BUN SERPL-MCNC: 24 MG/DL (ref 8–23)
CALCIUM SERPL-MCNC: 9.9 MG/DL (ref 8.7–10.5)
CHLORIDE SERPL-SCNC: 103 MMOL/L (ref 95–110)
CK SERPL-CCNC: 175 U/L (ref 20–180)
CO2 SERPL-SCNC: 26 MMOL/L (ref 23–29)
CREAT SERPL-MCNC: 1.1 MG/DL (ref 0.5–1.4)
CRP SERPL-MCNC: 0.3 MG/L
ERYTHROCYTE [SEDIMENTATION RATE] IN BLOOD BY PHOTOMETRIC METHOD: <2 MM/HR
GFR SERPLBLD CREATININE-BSD FMLA CKD-EPI: 54 ML/MIN/1.73/M2
GLUCOSE SERPL-MCNC: 93 MG/DL (ref 70–110)
POTASSIUM SERPL-SCNC: 5.2 MMOL/L (ref 3.5–5.1)
SODIUM SERPL-SCNC: 138 MMOL/L (ref 136–145)

## 2025-07-11 PROCEDURE — 82550 ASSAY OF CK (CPK): CPT

## 2025-07-11 PROCEDURE — 36415 COLL VENOUS BLD VENIPUNCTURE: CPT | Mod: PN

## 2025-07-11 PROCEDURE — 86140 C-REACTIVE PROTEIN: CPT

## 2025-07-11 PROCEDURE — 80048 BASIC METABOLIC PNL TOTAL CA: CPT

## 2025-07-11 PROCEDURE — 99999 PR PBB SHADOW E&M-EST. PATIENT-LVL IV: CPT | Mod: PBBFAC,,, | Performed by: STUDENT IN AN ORGANIZED HEALTH CARE EDUCATION/TRAINING PROGRAM

## 2025-07-11 PROCEDURE — 85652 RBC SED RATE AUTOMATED: CPT

## 2025-07-11 PROCEDURE — 86038 ANTINUCLEAR ANTIBODIES: CPT

## 2025-07-11 NOTE — TELEPHONE ENCOUNTER
Copied from CRM #[id number]. Topic: General Inquiry - Patient Advice  >> Jul 11, 2025 10:06 AM Cecilia wrote:  .1MEDICALADVICE     Patient is calling for Medical Advice regarding: Summer with [employer] is calling in regards to the PA on a US and a Echo. She states that she has questions in regards to the Ultrasound. Please call  ref [phone].     How long has patient had these symptoms:    Pharmacy name and phone#:    [facility], provide patient's call back phone number:    Comments:    Please advise patient replies from provider may take up to 48 hours.

## 2025-07-11 NOTE — PROGRESS NOTES
Marisa Lin  1955        Subjective     Chief Complaint: Fatigue    History of Present Illness:  Ms. Marisa Lin is a 69 y.o. female who presents to clinic for fatigue.  Overall just has felt fatigued. Hard to describe.     No coughing, no wheezing. No night sweats. Gaining weight, no weight loss.   Concerned about weight gain, BMI 22.  Improved with HRT. Has OBGYN f/u for continued HRT review.  Labs reviewed.  Mildly low iron. No anemia on labs.  GFR at 60.  LDL improved to 99.  A1c improved to 5.    Feels leg heaviness- Runs marathons. Denies claudication. Does have compression fractures and disc issues int he low back. Only with running or walking. Feels like since Covid feels like her training has slowed down. Was having issues with diarrhea when runs. That has been better since she has not been running. Only if runs 6+ miles or more, no issues if not running long distances.  Colon 10/2024- no further colonoscopy needed.    BP Readings from Last 5 Encounters:   07/11/25 130/82   06/18/25 118/78   06/16/25 131/89   10/10/24 100/63   08/20/24 129/84     Osteoporosis- Has repeat DEXA ordered. Has compression fractures.  Endocrine referral in place for medications.  MRI 6/2025  Impression:     Mild wedging of the T7 and T10 superior endplates associated with Schmorl's nodes compatible with chronic compression fracture deformities.  Degenerative changes of the cervical and lumbar spine notable for moderate central canal stenosis at C5-6 and C6-7 and right sided C5-6 severe foraminal stenosis.  Moderate right L5-S1 foraminal stenosis.  Nonstenotic central disc protrusions in the thoracic spine.    Seeing Dr. Linares in Neurosurgery 8/18.  Also following w/ Ortho.      ROS     PAST HISTORY:     Past Medical History:   Diagnosis Date    Abnormal Pap smear of cervix     ADD (attention deficit disorder)     Compression fracture of T10 vertebra     Kyphosis     Nontoxic single thyroid nodule     Osteoporosis   "      Past Surgical History:   Procedure Laterality Date    BIOPSY OF THYROID      CERVICAL BIOPSY      CIS    COLONOSCOPY      2015    COLONOSCOPY N/A 10/10/2024    Procedure: COLONOSCOPY;  Surgeon: Buster Sanderson MD;  Location: [facility] ENDOSCOPY;  Service: Endoscopy;  Laterality: N/A;  7/16/24-Ref Dr. Naylor, PEG, instr portal-DS  7/19/24-Pt confirmed new arrival time of 1200pm, updated instruction via portal, pt requests AM appt if possible-DS  10/2 pt aware of new arrival time of 10/15 AM-precall complete-st    FACIAL COSMETIC SURGERY      2017       Family History   Problem Relation Name Age of Onset    Kidney disease Mother      Suicide Mother      Heart disease Father      Osteoporosis Maternal Grandmother           MEDICATIONS & ALLERGIES:     Current Outpatient Medications on File Prior to Visit   Medication Sig    ADDERALL 30 mg Tab Take 30 mg by mouth once daily. 1/2 in the am, 1/2 in the pm daily.    denosumab (PROLIA SUBQ) Inject into the skin.    estradioL (ESTRACE) 0.01 % (0.1 mg/gram) vaginal cream Place 1 g vaginally once daily. Daily for two week then Monday, Wednesday & Friday at night    estradioL (VIVELLE-DOT) 0.1 mg/24 hr PTSW Place 1 patch onto the skin twice a week.    minoxidiL (LONITEN) 2.5 MG tablet Take 0.625 mg by mouth once daily.    progesterone (PROMETRIUM) 200 MG capsule Take 1 capsule (200 mg total) by mouth nightly.    RETIN-A 0.1 % cream Apply topically every evening.    testosterone (ANDROGEL) 20.25 mg/1.25 gram (1.62 %) GlPm Place 1-2 Pump onto the skin once daily.     No current facility-administered medications on file prior to visit.       Review of patient's allergies indicates:  No Known Allergies    OBJECTIVE:     Vital Signs:  Vitals:    07/11/25 0812   BP: 130/82   BP Location: Left arm   Patient Position: Sitting   Pulse: 88   SpO2: 98%   Weight: 56.7 kg (125 lb)   Height: 5' 3" (1.6 m)       Body mass index is 22.14 kg/m².     Physical Exam:  Physical Exam  Vitals and " "nursing note reviewed.   Constitutional:       General: She is not in acute distress.     Appearance: Normal appearance. She is not ill-appearing, toxic-appearing or diaphoretic.   HENT:      Head: Normocephalic and atraumatic.   Eyes:      General: No scleral icterus.        Right eye: No discharge.         Left eye: No discharge.      Conjunctiva/sclera: Conjunctivae normal.   Cardiovascular:      Rate and Rhythm: Normal rate and regular rhythm.      Pulses: Normal pulses.      Heart sounds: Murmur heard.   Pulmonary:      Effort: Pulmonary effort is normal. No respiratory distress.      Breath sounds: Normal breath sounds. No stridor. No wheezing, rhonchi or rales.   Musculoskeletal:         General: Normal range of motion.      Cervical back: Normal range of motion and neck supple. No rigidity or tenderness.      Right lower leg: No edema.      Left lower leg: No edema.   Lymphadenopathy:      Cervical: Cervical adenopathy present.   Skin:     General: Skin is warm and dry.   Neurological:      Mental Status: She is alert and oriented to person, place, and time. Mental status is at baseline.      Gait: Gait normal.   Psychiatric:         Mood and Affect: Mood normal.         Behavior: Behavior normal.            Laboratory  Lab Results   Component Value Date    GLU 85 07/09/2025     07/09/2025    K 4.2 07/09/2025     07/09/2025    CO2 25 07/09/2025    BUN 20 07/09/2025    CREATININE 1.0 07/09/2025    CALCIUM 9.2 07/09/2025     Lab Results   Component Value Date    HGBA1C 5.0 07/09/2025     No results for input(s): "POCTGLUCOSE" in the last 72 hours.        ASSESSMENT & PLAN:   Ms. Marisa Lin is a 69 y.o. female who was seen today in clinic for fatigue f/u.     1. Annual physical exam    2. Fatigue, unspecified type  -     Ambulatory referral/consult to Sleep Disorders; Future; Expected date: 07/18/2025  -     CK; Future; Expected date: 07/11/2025  -      Screen w/Reflex; Future; Expected date: " 07/11/2025  -     Sedimentation rate; Future; Expected date: 07/11/2025  -     C-Reactive Protein (In-House); Future; Expected date: 07/11/2025  -     US Soft Tissue Head Neck; Future; Expected date: 07/11/2025    3. LAD (lymphadenopathy), cervical  -     US Soft Tissue Head Neck; Future; Expected date: 07/11/2025    4. Snores  -     Ambulatory referral/consult to Sleep Disorders; Future; Expected date: 07/18/2025    5. Low iron    6. Decreased calculated GFR  -     Basic Metabolic Panel; Future; Expected date: 07/11/2025    7. Murmur  -     Echo; Future           Alexsandra Naylor MD

## 2025-07-14 LAB — ANA (OHS): NORMAL

## 2025-07-15 ENCOUNTER — HOSPITAL ENCOUNTER (OUTPATIENT)
Dept: RADIOLOGY | Facility: HOSPITAL | Age: 70
Discharge: HOME | End: 2025-07-15
Attending: STUDENT IN AN ORGANIZED HEALTH CARE EDUCATION/TRAINING PROGRAM
Payer: MEDICARE

## 2025-07-15 ENCOUNTER — HOSPITAL ENCOUNTER (OUTPATIENT)
Dept: CARDIOLOGY | Facility: OTHER | Age: 70
Discharge: HOME | End: 2025-07-15
Attending: STUDENT IN AN ORGANIZED HEALTH CARE EDUCATION/TRAINING PROGRAM
Payer: MEDICARE

## 2025-07-15 ENCOUNTER — TELEPHONE (OUTPATIENT)
Dept: PRIMARY CARE CLINIC | Facility: CLINIC | Age: 70
End: 2025-07-15
Payer: MEDICARE

## 2025-07-15 VITALS
HEIGHT: 63 IN | WEIGHT: 125 LBS | DIASTOLIC BLOOD PRESSURE: 82 MMHG | HEART RATE: 88 BPM | BODY MASS INDEX: 22.15 KG/M2 | SYSTOLIC BLOOD PRESSURE: 130 MMHG

## 2025-07-15 DIAGNOSIS — R01.1 MURMUR: ICD-10-CM

## 2025-07-15 DIAGNOSIS — R53.83 FATIGUE, UNSPECIFIED TYPE: ICD-10-CM

## 2025-07-15 DIAGNOSIS — R59.0 LAD (LYMPHADENOPATHY), CERVICAL: ICD-10-CM

## 2025-07-15 LAB
AORTIC SIZE INDEX (SOV): 1.7 CM/M2
AORTIC SIZE INDEX: 1.8 CM/M2
ASCENDING AORTA: 2.8 CM
AV INDEX (PROSTH): 0.56
AV MEAN GRADIENT: 7 MMHG
AV PEAK GRADIENT: 10 MMHG
AV VALVE AREA BY VELOCITY RATIO: 2 CM²
AV VALVE AREA: 1.8 CM²
AV VELOCITY RATIO: 0.63
BSA FOR ECHO PROCEDURE: 1.59 M2
CV ECHO LV RWT: 0.38 CM
DOP CALC AO PEAK VEL: 1.6 M/S
DOP CALC AO VTI: 34.4 CM
DOP CALC LVOT AREA: 3.1 CM2
DOP CALC LVOT DIAMETER: 2 CM
DOP CALC LVOT PEAK VEL: 1 M/S
DOP CALC LVOT STROKE VOLUME: 60.9 CM3
DOP CALC RVOT PEAK VEL: 0.65 M/S
DOP CALCLVOT PEAK VEL VTI: 19.4 CM
E WAVE DECELERATION TIME: 187 MSEC
E/A RATIO: 0.55
E/E' RATIO: 5 M/S
ECHO LV POSTERIOR WALL: 0.8 CM (ref 0.6–1.1)
FRACTIONAL SHORTENING: 31 % (ref 28–44)
INTERVENTRICULAR SEPTUM: 0.9 CM (ref 0.6–1.1)
IVC DIAMETER: 1.19 CM
LA MAJOR: 4.1 CM
LA MINOR: 4.2 CM
LA WIDTH: 2.8 CM
LEFT ATRIUM AREA SYSTOLIC (APICAL 2 CHAMBER): 13.3 CM2
LEFT ATRIUM AREA SYSTOLIC (APICAL 4 CHAMBER): 11.67 CM2
LEFT ATRIUM SIZE: 2.7 CM
LEFT ATRIUM VOLUME INDEX MOD: 19 ML/M2
LEFT ATRIUM VOLUME INDEX: 17 ML/M2
LEFT ATRIUM VOLUME MOD: 30 ML
LEFT ATRIUM VOLUME: 27 CM3
LEFT INTERNAL DIMENSION IN SYSTOLE: 2.9 CM (ref 2.1–4)
LEFT VENTRICLE DIASTOLIC VOLUME INDEX: 48.73 ML/M2
LEFT VENTRICLE DIASTOLIC VOLUME: 77 ML
LEFT VENTRICLE END SYSTOLIC VOLUME APICAL 2 CHAMBER: 30.48 ML
LEFT VENTRICLE END SYSTOLIC VOLUME APICAL 4 CHAMBER: 26.93 ML
LEFT VENTRICLE MASS INDEX: 69.5 G/M2
LEFT VENTRICLE SYSTOLIC VOLUME INDEX: 19.6 ML/M2
LEFT VENTRICLE SYSTOLIC VOLUME: 31 ML
LEFT VENTRICULAR INTERNAL DIMENSION IN DIASTOLE: 4.2 CM (ref 3.5–6)
LEFT VENTRICULAR MASS: 109.8 G
LV LATERAL E/E' RATIO: 3.9 M/S
LV SEPTAL E/E' RATIO: 6.7 M/S
LVED V (TEICH): 77.46 ML
LVES V (TEICH): 30.92 ML
LVOT MG: 2.3 MMHG
LVOT MV: 0.72 CM/S
Lab: 1.7 CM/M
Lab: 1.7 CM/M
MV PEAK A VEL: 0.86 M/S
MV PEAK E VEL: 0.47 M/S
MV STENOSIS PRESSURE HALF TIME: 54.13 MS
MV VALVE AREA P 1/2 METHOD: 4.06 CM2
OHS CV CPX PATIENT HEIGHT IN: 63
OHS CV RV/LV RATIO: 0.81 CM
PISA TR MAX VEL: 2.2 M/S
PV PEAK GRADIENT: 3 MMHG
PV PEAK VELOCITY: 0.93 M/S
RA MAJOR: 3.88 CM
RA PRESSURE ESTIMATED: 3 MMHG
RA WIDTH: 3.1 CM
RIGHT VENTRICLE DIASTOLIC BASEL DIMENSION: 3.4 CM
RV TB RVSP: 5 MMHG
RV TISSUE DOPPLER FREE WALL SYSTOLIC VELOCITY 1 (APICAL 4 CHAMBER VIEW): 14.46 CM/S
SINUS: 2.7 CM
STJ: 2.4 CM
TDI LATERAL: 0.12 M/S
TDI SEPTAL: 0.07 M/S
TDI: 0.1 M/S
TR MAX PG: 19 MMHG
TRICUSPID ANNULAR PLANE SYSTOLIC EXCURSION: 1.9 CM
TV REST PULMONARY ARTERY PRESSURE: 22 MMHG
Z-SCORE OF LEFT VENTRICULAR DIMENSION IN END DIASTOLE: -0.75
Z-SCORE OF LEFT VENTRICULAR DIMENSION IN END SYSTOLE: 0.26

## 2025-07-15 PROCEDURE — 76536 US EXAM OF HEAD AND NECK: CPT | Mod: 26,,, | Performed by: RADIOLOGY

## 2025-07-15 PROCEDURE — 76536 US EXAM OF HEAD AND NECK: CPT | Mod: TC

## 2025-07-15 PROCEDURE — 93306 TTE W/DOPPLER COMPLETE: CPT | Mod: 26,,, | Performed by: INTERNAL MEDICINE

## 2025-07-15 PROCEDURE — 93306 TTE W/DOPPLER COMPLETE: CPT

## 2025-07-15 NOTE — TELEPHONE ENCOUNTER
Copied from CRM #[id number]. Topic: General Inquiry - Test Results  >> Jul 15, 2025  1:38 PM Jessica wrote:  Caller: Marisa Lin     Reason for calling: Pt states its been over a week and she hasn't gotten her ct scan results back.Pt states to please call back with further assistance in going over her results with her.     Best contact number:[phone] ()

## 2025-07-15 NOTE — TELEPHONE ENCOUNTER
Spoke to pt to inform her that her results come in today and once reviewed Dr. Naylor will reach out.

## 2025-07-21 ENCOUNTER — APPOINTMENT (OUTPATIENT)
Dept: URBAN - METROPOLITAN AREA CLINIC 15 | Facility: CLINIC | Age: 70
Setting detail: DERMATOLOGY
End: 2025-07-21

## 2025-07-21 VITALS — WEIGHT: 117 LBS | HEIGHT: 61.81 IN

## 2025-07-21 DIAGNOSIS — L81.4 OTHER MELANIN HYPERPIGMENTATION: ICD-10-CM

## 2025-07-21 DIAGNOSIS — D49.2 NEOPLASM OF UNSPECIFIED BEHAVIOR OF BONE, SOFT TISSUE, AND SKIN: ICD-10-CM

## 2025-07-21 DIAGNOSIS — L64.8 OTHER ANDROGENIC ALOPECIA: ICD-10-CM | Status: INADEQUATELY CONTROLLED

## 2025-07-21 DIAGNOSIS — D18.0 HEMANGIOMA: ICD-10-CM

## 2025-07-21 DIAGNOSIS — D22 MELANOCYTIC NEVI: ICD-10-CM

## 2025-07-21 PROBLEM — D22.5 MELANOCYTIC NEVI OF TRUNK: Status: ACTIVE | Noted: 2025-07-21

## 2025-07-21 PROBLEM — D18.01 HEMANGIOMA OF SKIN AND SUBCUTANEOUS TISSUE: Status: ACTIVE | Noted: 2025-07-21

## 2025-07-21 PROCEDURE — ?: Mod: 25

## 2025-07-21 PROCEDURE — ? ADDITIONAL NOTES

## 2025-07-21 PROCEDURE — ? SUNSCREEN RECOMMENDATIONS

## 2025-07-21 PROCEDURE — ? COUNSELING

## 2025-07-21 PROCEDURE — ? BIOPSY BY SHAVE METHOD

## 2025-07-21 PROCEDURE — ? PRESCRIPTION MEDICATION MANAGEMENT

## 2025-07-21 PROCEDURE — ? PRESCRIPTION

## 2025-07-21 PROCEDURE — ?

## 2025-07-21 RX ORDER — MINOXIDIL 2.5 MG/1
1 TABLET ORAL AS DIRECTED
Qty: 30 | Refills: 2 | Status: ERX

## 2025-07-21 RX ORDER — FINASTERIDE 5 MG/1
1 TABLET, FILM COATED ORAL
Qty: 30 | Refills: 6 | Status: ERX | COMMUNITY
Start: 2025-07-21

## 2025-07-21 RX ADMIN — FINASTERIDE 1: 5 TABLET, FILM COATED ORAL at 00:00

## 2025-07-21 ASSESSMENT — LOCATION DETAILED DESCRIPTION DERM
LOCATION DETAILED: LEFT PROXIMAL CALF
LOCATION DETAILED: LEFT ANTERIOR DISTAL THIGH
LOCATION DETAILED: LEFT INFERIOR MEDIAL UPPER BACK
LOCATION DETAILED: LEFT POSTERIOR SHOULDER
LOCATION DETAILED: RIGHT SUPERIOR PARIETAL SCALP
LOCATION DETAILED: RIGHT PROXIMAL PRETIBIAL REGION
LOCATION DETAILED: PERIUMBILICAL SKIN
LOCATION DETAILED: RIGHT PROXIMAL DORSAL FOREARM
LOCATION DETAILED: LEFT PROXIMAL DORSAL FOREARM
LOCATION DETAILED: RIGHT DISTAL POSTERIOR THIGH
LOCATION DETAILED: RIGHT MEDIAL SUPERIOR CHEST
LOCATION DETAILED: RIGHT POSTERIOR SHOULDER
LOCATION DETAILED: LEFT MID-UPPER BACK
LOCATION DETAILED: LEFT INFRAMAMMARY CREASE (INNER QUADRANT)
LOCATION DETAILED: UPPER STERNUM

## 2025-07-21 ASSESSMENT — LOCATION SIMPLE DESCRIPTION DERM
LOCATION SIMPLE: LEFT BREAST
LOCATION SIMPLE: LEFT SHOULDER
LOCATION SIMPLE: RIGHT FOREARM
LOCATION SIMPLE: CHEST
LOCATION SIMPLE: LEFT UPPER BACK
LOCATION SIMPLE: LEFT THIGH
LOCATION SIMPLE: RIGHT POSTERIOR THIGH
LOCATION SIMPLE: RIGHT PRETIBIAL REGION
LOCATION SIMPLE: SCALP
LOCATION SIMPLE: ABDOMEN
LOCATION SIMPLE: LEFT FOREARM
LOCATION SIMPLE: RIGHT SHOULDER
LOCATION SIMPLE: LEFT CALF

## 2025-07-21 ASSESSMENT — LOCATION ZONE DERM
LOCATION ZONE: ARM
LOCATION ZONE: TRUNK
LOCATION ZONE: LEG
LOCATION ZONE: SCALP

## 2025-07-21 ASSESSMENT — SEVERITY OF ALOPECIA TOOL: % SCALP HAIR LOST: 28

## 2025-07-21 NOTE — PROCEDURE: PRESCRIPTION MEDICATION MANAGEMENT
Plan: .\\nConsider biopsy if no improvement noted with the above measures
Continue Regimen: .\\n\\n-Minoxidil 2.5 mg tablet. Take half of a tablet by mouth once daily. Patient advised to measure blood pressure 30 minutes before and after taking it.
Initiate Treatment: .\\n\\nfinasteride 5 mg tablet . Take half a tablet daily. Prescription sent to [facility]
Detail Level: Zone
Render In Strict Bullet Format?: No

## 2025-07-21 NOTE — PROCEDURE: ADDITIONAL NOTES
Render Risk Assessment In Note?: no
Additional Notes: .\\n\\n-Patient would like to increase minoxidil 2.5mg from half tablet to 1 full tablet daily. She reports unwanted facial hair growth. She was advised that the chances of getting more facial hair by increasing the dose, was high. For this reason, pt prefers to continue current dosage.
Detail Level: Detailed

## 2025-07-21 NOTE — HPI: EVALUATION OF SKIN LESION(S)
What Type Of Note Output Would You Prefer (Optional)?: Bullet Format
How Severe Are Your Spot(S)?: mild
Have Your Spot(S) Been Treated In The Past?: has not been treated
Hpi Title: Evaluation of Skin Lesions
Additional History: Patient is in office for FBSE.

## 2025-07-21 NOTE — PROCEDURE: BIOPSY BY SHAVE METHOD
Detail Level: Detailed
Depth Of Biopsy: dermis
Was A Bandage Applied: Yes
Size Of Lesion In Cm: 0.4
X Size Of Lesion In Cm: 0
Biopsy Type: H and E
Biopsy Method: Dermablade
Anesthesia Type: 1% lidocaine with epinephrine
Anesthesia Volume In Cc: 0.5
Hemostasis: Drysol
Wound Care: Petrolatum
Dressing: bandage
Destruction After The Procedure: No
Type Of Destruction Used: Curettage
Curettage Text: The wound bed was treated with curettage after the biopsy was performed.
Cryotherapy Text: The wound bed was treated with cryotherapy after the biopsy was performed.
Electrodesiccation Text: The wound bed was treated with electrodesiccation after the biopsy was performed.
Electrodesiccation And Curettage Text: The wound bed was treated with electrodesiccation and curettage after the biopsy was performed.
Silver Nitrate Text: The wound bed was treated with silver nitrate after the biopsy was performed.
Lab: 1353
Medical Necessity Information: It is in your best interest to select a reason for this procedure from the list below. All of these items fulfill various CMS LCD requirements except the new and changing color options.
Consent: Written consent was obtained and risks were reviewed including but not limited to scarring, infection, bleeding, scabbing, incomplete removal, nerve damage and allergy to anesthesia.
Post-Care Instructions: I reviewed with the patient in detail post-care instructions. Patient is to keep the biopsy site dry for 24 hrs, and then apply Vaseline 2-3 times daily until healed. Keep the wound covered with a new bandage each day to promote wound healing.
Notification Instructions: Patient will be notified of biopsy results. However, patient instructed to call the office if not contacted within 2 weeks.
Billing Type: Third-Party Bill
Information: Selecting Yes will display possible errors in your note based on the variables you have selected. This validation is only offered as a suggestion for you. PLEASE NOTE THAT THE VALIDATION TEXT WILL BE REMOVED WHEN YOU FINALIZE YOUR NOTE. IF YOU WANT TO FAX A PRELIMINARY NOTE YOU WILL NEED TO TOGGLE THIS TO 'NO' IF YOU DO NOT WANT IT IN YOUR FAXED NOTE.

## 2025-07-21 NOTE — PROCEDURE: COUNSELING
Detail Level: Generalized
Detail Level: Detailed
Minoxidil 5% Topical Foam Recommendations: Can purchase OTC at [employer], [employer], or [employer].
Detail Level: Zone

## 2025-07-21 NOTE — PROCEDURE: SUNSCREEN RECOMMENDATIONS
Detail Level: Generalized
Products Recommended: - recommend applying sunscreen every 2 hours, especially when outdoors: good brands include: Neutrogena, La Roche Posay, & Eucerin 35 (sensitive mineral face with zinc oxide), Blue Lizard, and for tinted: Neutrogena, La Roche Posay, Argelia, Ronald, Elta MD, Eucerin 50 (tinted age defense + HA) & Topix (moisturizing antioxidant sunscreen).\\n- Sun Bum is a great brand I recommend as a body spray for arms, legs, and other exposed surfaces of skin that are not the face & neck.\\n\\n- recommend using UPF clothing for outdoor activities such as swimming, boating, golfing (good brands include Coolibar or Ector)
General Sunscreen Counseling: I recommended a broad spectrum sunscreen with a SPF of 30 or higher.  I explained that SPF 30 sunscreens block approximately 97 percent of the sun's harmful rays.  Sunscreens should be applied at least 15 minutes prior to expected sun exposure and then every 2 hours after that as long as sun exposure continues. If swimming or exercising sunscreen should be reapplied every 45 minutes to an hour after getting wet or sweating.  One ounce, or the equivalent of a shot glass full of sunscreen, is adequate to protect the skin not covered by a bathing suit. I also recommended a lip balm with a sunscreen as well. Sun protective clothing can be used in lieu of sunscreen but must be worn the entire time you are exposed to the sun's rays.

## 2025-07-22 ENCOUNTER — PATIENT MESSAGE (OUTPATIENT)
Dept: ADMINISTRATIVE | Facility: HOSPITAL | Age: 70
End: 2025-07-22
Payer: MEDICARE

## 2025-07-22 ENCOUNTER — PATIENT OUTREACH (OUTPATIENT)
Dept: ADMINISTRATIVE | Facility: HOSPITAL | Age: 70
End: 2025-07-22
Payer: MEDICARE

## 2025-07-22 ENCOUNTER — PATIENT MESSAGE (OUTPATIENT)
Dept: ADMINISTRATIVE | Facility: OTHER | Age: 70
End: 2025-07-22
Payer: MEDICARE

## 2025-07-22 DIAGNOSIS — Z12.39 ENCOUNTER FOR SCREENING FOR MALIGNANT NEOPLASM OF BREAST, UNSPECIFIED SCREENING MODALITY: Primary | ICD-10-CM

## 2025-07-22 DIAGNOSIS — Z12.31 ENCOUNTER FOR SCREENING MAMMOGRAM FOR MALIGNANT NEOPLASM OF BREAST: ICD-10-CM

## 2025-07-22 NOTE — PROGRESS NOTES
Campaigns follow up. 2024 mammogram linked to [facility]. Pt due, order placed- communicated with pt via portal      , care everywhere, immunizations updated  Chart review complete

## 2025-07-25 ENCOUNTER — PATIENT MESSAGE (OUTPATIENT)
Dept: OBSTETRICS AND GYNECOLOGY | Facility: CLINIC | Age: 70
End: 2025-07-25
Payer: MEDICARE

## 2025-08-12 ENCOUNTER — TELEPHONE (OUTPATIENT)
Dept: OBSTETRICS AND GYNECOLOGY | Facility: CLINIC | Age: 70
End: 2025-08-12
Payer: MEDICARE

## 2025-08-15 ENCOUNTER — OFFICE VISIT (OUTPATIENT)
Dept: ENDOCRINOLOGY | Facility: CLINIC | Age: 70
End: 2025-08-15
Payer: MEDICARE

## 2025-08-15 ENCOUNTER — PATIENT MESSAGE (OUTPATIENT)
Dept: OBSTETRICS AND GYNECOLOGY | Facility: CLINIC | Age: 70
End: 2025-08-15
Payer: MEDICARE

## 2025-08-15 ENCOUNTER — LAB VISIT (OUTPATIENT)
Dept: LAB | Facility: HOSPITAL | Age: 70
End: 2025-08-15
Attending: STUDENT IN AN ORGANIZED HEALTH CARE EDUCATION/TRAINING PROGRAM
Payer: MEDICARE

## 2025-08-15 ENCOUNTER — PATIENT MESSAGE (OUTPATIENT)
Dept: ENDOCRINOLOGY | Facility: CLINIC | Age: 70
End: 2025-08-15

## 2025-08-15 ENCOUNTER — TELEPHONE (OUTPATIENT)
Dept: OBSTETRICS AND GYNECOLOGY | Facility: CLINIC | Age: 70
End: 2025-08-15
Payer: MEDICARE

## 2025-08-15 DIAGNOSIS — R79.89 LOW TESTOSTERONE LEVEL IN FEMALE: ICD-10-CM

## 2025-08-15 DIAGNOSIS — M80.00XD AGE-RELATED OSTEOPOROSIS WITH CURRENT PATHOLOGICAL FRACTURE WITH ROUTINE HEALING: Chronic | ICD-10-CM

## 2025-08-15 DIAGNOSIS — N95.1 MENOPAUSAL SYMPTOMS: Primary | ICD-10-CM

## 2025-08-15 DIAGNOSIS — N17.9 AKI (ACUTE KIDNEY INJURY): ICD-10-CM

## 2025-08-15 DIAGNOSIS — E87.5 HYPERKALEMIA: ICD-10-CM

## 2025-08-15 DIAGNOSIS — M81.8 OTHER OSTEOPOROSIS WITHOUT CURRENT PATHOLOGICAL FRACTURE: ICD-10-CM

## 2025-08-15 DIAGNOSIS — G47.00 INSOMNIA, UNSPECIFIED TYPE: ICD-10-CM

## 2025-08-15 DIAGNOSIS — M81.0 AGE-RELATED OSTEOPOROSIS WITHOUT CURRENT PATHOLOGICAL FRACTURE: ICD-10-CM

## 2025-08-15 DIAGNOSIS — R53.83 FATIGUE, UNSPECIFIED TYPE: ICD-10-CM

## 2025-08-15 DIAGNOSIS — Z79.890 HORMONE REPLACEMENT THERAPY (HRT): ICD-10-CM

## 2025-08-15 DIAGNOSIS — Z78.0 MENOPAUSE: ICD-10-CM

## 2025-08-15 DIAGNOSIS — S22.070S COMPRESSION FRACTURE OF T10 VERTEBRA, SEQUELA: Primary | ICD-10-CM

## 2025-08-15 DIAGNOSIS — T45.8X5A ADVERSE EFFECT OF ORAL BISPHOSPHONATES: ICD-10-CM

## 2025-08-15 DIAGNOSIS — N95.1 MENOPAUSAL SYMPTOMS: ICD-10-CM

## 2025-08-15 LAB
25(OH)D3+25(OH)D2 SERPL-MCNC: 55 NG/ML (ref 30–96)
ANION GAP (OHS): 7 MMOL/L (ref 8–16)
BUN SERPL-MCNC: 17 MG/DL (ref 8–23)
CALCIUM SERPL-MCNC: 9 MG/DL (ref 8.7–10.5)
CHLORIDE SERPL-SCNC: 105 MMOL/L (ref 95–110)
CO2 SERPL-SCNC: 27 MMOL/L (ref 23–29)
CREAT SERPL-MCNC: 1 MG/DL (ref 0.5–1.4)
GFR SERPLBLD CREATININE-BSD FMLA CKD-EPI: 60 ML/MIN/1.73/M2
GLUCOSE SERPL-MCNC: 96 MG/DL (ref 70–110)
PHOSPHATE SERPL-MCNC: 2.8 MG/DL (ref 2.7–4.5)
POTASSIUM SERPL-SCNC: 4.4 MMOL/L (ref 3.5–5.1)
SODIUM SERPL-SCNC: 139 MMOL/L (ref 136–145)
TESTOST SERPL-MCNC: 40 NG/DL (ref 5–73)

## 2025-08-15 PROCEDURE — 80051 ELECTROLYTE PANEL: CPT

## 2025-08-15 PROCEDURE — 84403 ASSAY OF TOTAL TESTOSTERONE: CPT

## 2025-08-15 PROCEDURE — 36415 COLL VENOUS BLD VENIPUNCTURE: CPT

## 2025-08-15 PROCEDURE — 84100 ASSAY OF PHOSPHORUS: CPT

## 2025-08-15 PROCEDURE — 82306 VITAMIN D 25 HYDROXY: CPT

## 2025-08-18 ENCOUNTER — LAB VISIT (OUTPATIENT)
Dept: LAB | Facility: HOSPITAL | Age: 70
End: 2025-08-18
Attending: STUDENT IN AN ORGANIZED HEALTH CARE EDUCATION/TRAINING PROGRAM
Payer: MEDICARE

## 2025-08-18 DIAGNOSIS — N95.1 MENOPAUSAL SYMPTOMS: ICD-10-CM

## 2025-08-18 DIAGNOSIS — M81.8 OTHER OSTEOPOROSIS WITHOUT CURRENT PATHOLOGICAL FRACTURE: ICD-10-CM

## 2025-08-18 DIAGNOSIS — R79.89 LOW TESTOSTERONE LEVEL IN FEMALE: ICD-10-CM

## 2025-08-18 DIAGNOSIS — Z79.890 HORMONE REPLACEMENT THERAPY (HRT): ICD-10-CM

## 2025-08-18 DIAGNOSIS — M81.0 AGE-RELATED OSTEOPOROSIS WITHOUT CURRENT PATHOLOGICAL FRACTURE: ICD-10-CM

## 2025-08-18 LAB
ESTRADIOL SERPL HS-MCNC: 83 PG/ML
FSH SERPL-ACNC: 23.08 MIU/ML
LH SERPL-ACNC: 3.9 MIU/ML
PTH-INTACT SERPL-MCNC: 47.9 PG/ML (ref 9–77)

## 2025-08-18 PROCEDURE — 82040 ASSAY OF SERUM ALBUMIN: CPT

## 2025-08-18 PROCEDURE — 84402 ASSAY OF FREE TESTOSTERONE: CPT

## 2025-08-18 PROCEDURE — 82523 COLLAGEN CROSSLINKS: CPT

## 2025-08-18 PROCEDURE — 36415 COLL VENOUS BLD VENIPUNCTURE: CPT

## 2025-08-18 PROCEDURE — 83001 ASSAY OF GONADOTROPIN (FSH): CPT

## 2025-08-18 PROCEDURE — 82670 ASSAY OF TOTAL ESTRADIOL: CPT

## 2025-08-18 PROCEDURE — 83970 ASSAY OF PARATHORMONE: CPT

## 2025-08-18 PROCEDURE — 84270 ASSAY OF SEX HORMONE GLOBUL: CPT

## 2025-08-18 PROCEDURE — 83002 ASSAY OF GONADOTROPIN (LH): CPT

## 2025-08-18 PROCEDURE — 83519 RIA NONANTIBODY: CPT

## 2025-08-19 ENCOUNTER — OFFICE VISIT (OUTPATIENT)
Dept: OBSTETRICS AND GYNECOLOGY | Facility: CLINIC | Age: 70
End: 2025-08-19
Attending: OBSTETRICS & GYNECOLOGY
Payer: MEDICARE

## 2025-08-19 VITALS
DIASTOLIC BLOOD PRESSURE: 82 MMHG | SYSTOLIC BLOOD PRESSURE: 144 MMHG | HEART RATE: 80 BPM | WEIGHT: 125 LBS | BODY MASS INDEX: 22.15 KG/M2 | HEIGHT: 63 IN

## 2025-08-19 DIAGNOSIS — L64.9 ANDROGENIC ALOPECIA: ICD-10-CM

## 2025-08-19 DIAGNOSIS — N95.1 MENOPAUSAL SYMPTOM: Primary | ICD-10-CM

## 2025-08-19 DIAGNOSIS — M85.80 OSTEOPENIA, UNSPECIFIED LOCATION: ICD-10-CM

## 2025-08-19 PROCEDURE — 1159F MED LIST DOCD IN RCRD: CPT | Mod: CPTII,S$GLB,, | Performed by: OBSTETRICS & GYNECOLOGY

## 2025-08-19 PROCEDURE — 3008F BODY MASS INDEX DOCD: CPT | Mod: CPTII,S$GLB,, | Performed by: OBSTETRICS & GYNECOLOGY

## 2025-08-19 PROCEDURE — 99214 OFFICE O/P EST MOD 30 MIN: CPT | Mod: S$GLB,,, | Performed by: OBSTETRICS & GYNECOLOGY

## 2025-08-19 PROCEDURE — 99999 PR PBB SHADOW E&M-EST. PATIENT-LVL III: CPT | Mod: PBBFAC,,, | Performed by: OBSTETRICS & GYNECOLOGY

## 2025-08-19 PROCEDURE — 3288F FALL RISK ASSESSMENT DOCD: CPT | Mod: CPTII,S$GLB,, | Performed by: OBSTETRICS & GYNECOLOGY

## 2025-08-19 PROCEDURE — 3077F SYST BP >= 140 MM HG: CPT | Mod: CPTII,S$GLB,, | Performed by: OBSTETRICS & GYNECOLOGY

## 2025-08-19 PROCEDURE — 3044F HG A1C LEVEL LT 7.0%: CPT | Mod: CPTII,S$GLB,, | Performed by: OBSTETRICS & GYNECOLOGY

## 2025-08-19 PROCEDURE — 1126F AMNT PAIN NOTED NONE PRSNT: CPT | Mod: CPTII,S$GLB,, | Performed by: OBSTETRICS & GYNECOLOGY

## 2025-08-19 PROCEDURE — 1101F PT FALLS ASSESS-DOCD LE1/YR: CPT | Mod: CPTII,S$GLB,, | Performed by: OBSTETRICS & GYNECOLOGY

## 2025-08-19 PROCEDURE — 3079F DIAST BP 80-89 MM HG: CPT | Mod: CPTII,S$GLB,, | Performed by: OBSTETRICS & GYNECOLOGY

## 2025-08-19 RX ORDER — FINASTERIDE 5 MG/1
2.5 TABLET, FILM COATED ORAL
COMMUNITY
Start: 2025-07-21

## 2025-08-19 RX ORDER — TESTOSTERONE 20.25 MG/1.25G
GEL TOPICAL
Qty: 88 G | Refills: 1 | Status: SHIPPED | OUTPATIENT
Start: 2025-08-19

## 2025-08-19 RX ORDER — PROGESTERONE 200 MG/1
200 CAPSULE ORAL NIGHTLY
Qty: 90 CAPSULE | Refills: 3 | Status: SHIPPED | OUTPATIENT
Start: 2025-08-19 | End: 2026-08-19

## 2025-08-20 LAB — CEFOTAXIME ISLT MIC: <50 PG/ML

## 2025-08-21 LAB
PINP SER-MCNC: 17 MCG/L
W SEX HORMONE BINDING GLOBULIN: 107 NMOL/L

## 2025-08-22 LAB
W ALBUMIN: 4.3 G/DL
W SEX HORMONE BINDING GLOBULIN: 106 NMOL/L
W TESTOSTERONE, BIOAVAIL: 8.3 NG/DL
W TESTOSTERONE, FREE: 4.2 PG/ML
W TESTOSTERONE, TOTAL, MS: 96 NG/DL

## 2025-08-25 PROBLEM — R53.83 FATIGUE: Status: RESOLVED | Noted: 2025-07-11 | Resolved: 2025-08-25
